# Patient Record
Sex: FEMALE | Race: WHITE
[De-identification: names, ages, dates, MRNs, and addresses within clinical notes are randomized per-mention and may not be internally consistent; named-entity substitution may affect disease eponyms.]

---

## 2017-03-08 ENCOUNTER — HOSPITAL ENCOUNTER (OUTPATIENT)
Dept: HOSPITAL 45 - C.MAMM | Age: 72
Discharge: HOME | End: 2017-03-08
Attending: INTERNAL MEDICINE
Payer: COMMERCIAL

## 2017-03-08 DIAGNOSIS — Z12.31: Primary | ICD-10-CM

## 2017-03-08 NOTE — MAMMOGRAPHY REPORT
BILATERAL DIGITAL SCREENING MAMMOGRAM WITH CAD: 3/8/2017

CLINICAL HISTORY: Routine screening examination.  





TECHNIQUE: Bilateral CC, MLO and repeat left MLO view for motion were obtained.  Current study was a
lso evaluated with a Computer Aided Detection (CAD) system.  



COMPARISON: Comparison is made to exams dated:  3/7/2016 mammogram, 11/5/2012 mammogram, 11/4/2011 [facility], and 3/12/2008.   



BREAST COMPOSITION:  There are scattered areas of fibroglandular density in both breasts.  



FINDINGS: There are stable asymmetries in the superior aspect of the breasts, and benign coarse calc
ifications in the right breast.  No suspicious mass, architectural distortion or cluster of microcal
cifications is seen.  



IMPRESSION:  ACR BI-RADS CATEGORY 1: NEGATIVE

There is no mammographic evidence of malignancy. A 1 year screening mammogram is recommended.  The p
atient will receive written notification of the results.  





Approximately 10% of breast cancers are not detected with mammography. A negative mammographic repor
t should not delay biopsy if a clinically suggestive mass is present.



Annelise Rojo M.D.          

ay/:3/8/2017 14:59:10  



Imaging Technologist: Lauren SAMSON(PETER)(M), [facility]

letter sent: Normal 1/2  

BI-RADS Code: ACR BI-RADS Category 1: Negative

## 2017-03-27 ENCOUNTER — HOSPITAL ENCOUNTER (OUTPATIENT)
Dept: HOSPITAL 45 - C.LAB1850 | Age: 72
Discharge: HOME | End: 2017-03-27
Attending: INTERNAL MEDICINE
Payer: COMMERCIAL

## 2017-03-27 DIAGNOSIS — E03.9: ICD-10-CM

## 2017-03-27 DIAGNOSIS — E55.9: ICD-10-CM

## 2017-03-27 DIAGNOSIS — E53.8: ICD-10-CM

## 2017-03-27 DIAGNOSIS — E78.5: Primary | ICD-10-CM

## 2017-03-27 DIAGNOSIS — D75.89: ICD-10-CM

## 2017-03-27 LAB
ALBUMIN/GLOB SERPL: 1.2 {RATIO} (ref 0.9–2)
ALP SERPL-CCNC: 105 U/L (ref 45–117)
ALT SERPL-CCNC: 37 U/L (ref 12–78)
ANION GAP SERPL CALC-SCNC: 10 MMOL/L (ref 3–11)
AST SERPL-CCNC: 19 U/L (ref 15–37)
BASOPHILS # BLD: 0.03 K/UL (ref 0–0.2)
BASOPHILS NFR BLD: 0.3 %
BUN SERPL-MCNC: 31 MG/DL (ref 7–18)
BUN/CREAT SERPL: 20.9 (ref 10–20)
CALCIUM SERPL-MCNC: 8.6 MG/DL (ref 8.5–10.1)
CHLORIDE SERPL-SCNC: 110 MMOL/L (ref 98–107)
CHOLEST/HDLC SERPL: 5.1 {RATIO}
CO2 SERPL-SCNC: 22 MMOL/L (ref 21–32)
COMPLETE: YES
CREAT SERPL-MCNC: 1.5 MG/DL (ref 0.6–1.2)
EOSINOPHIL NFR BLD AUTO: 321 K/UL (ref 130–400)
GLOBULIN SER-MCNC: 3.4 GM/DL (ref 2.5–4)
GLUCOSE SERPL-MCNC: 74 MG/DL (ref 70–99)
GLUCOSE UR QL: 49 MG/DL
HCT VFR BLD CALC: 38 % (ref 37–47)
IG%: 0.4 %
IMM GRANULOCYTES NFR BLD AUTO: 19.4 %
LYMPHOCYTES # BLD: 1.92 K/UL (ref 1.2–3.4)
MACROCYTES BLD QL SMEAR: PRESENT
MCH RBC QN AUTO: 34.8 PG (ref 25–34)
MCHC RBC AUTO-ENTMCNC: 31.3 G/DL (ref 32–36)
MCV RBC AUTO: 111.1 FL (ref 80–100)
MONOCYTES NFR BLD: 6.4 %
NEUTROPHILS # BLD AUTO: 3.1 %
NEUTROPHILS NFR BLD AUTO: 70.4 %
NITRITE UR QL STRIP: 417 MG/DL (ref 0–150)
PH UR: 249 MG/DL (ref 0–200)
PMV BLD AUTO: 9 FL (ref 7.4–10.4)
POTASSIUM SERPL-SCNC: 4.7 MMOL/L (ref 3.5–5.1)
RBC # BLD AUTO: 3.42 M/UL (ref 4.2–5.4)
SODIUM SERPL-SCNC: 142 MMOL/L (ref 136–145)
TSH SERPL-ACNC: 18.2 UIU/ML (ref 0.3–4.5)
WBC # BLD AUTO: 9.92 K/UL (ref 4.8–10.8)

## 2017-05-03 ENCOUNTER — HOSPITAL ENCOUNTER (OUTPATIENT)
Dept: HOSPITAL 45 - C.LAB1850 | Age: 72
Discharge: HOME | End: 2017-05-03
Attending: INTERNAL MEDICINE
Payer: COMMERCIAL

## 2017-05-03 DIAGNOSIS — E03.9: Primary | ICD-10-CM

## 2017-05-03 DIAGNOSIS — D50.9: ICD-10-CM

## 2017-05-03 LAB
ANION GAP SERPL CALC-SCNC: 7 MMOL/L (ref 3–11)
ANISOCYTOSIS BLD QL SMEAR: PRESENT
BASOPHILS # BLD: 0.05 K/UL (ref 0–0.2)
BASOPHILS NFR BLD: 0.6 %
BUN SERPL-MCNC: 32 MG/DL (ref 7–18)
BUN/CREAT SERPL: 20.2 (ref 10–20)
CALCIUM SERPL-MCNC: 9 MG/DL (ref 8.5–10.1)
CHLORIDE SERPL-SCNC: 109 MMOL/L (ref 98–107)
CO2 SERPL-SCNC: 25 MMOL/L (ref 21–32)
COMPLETE: YES
CREAT SERPL-MCNC: 1.6 MG/DL (ref 0.6–1.2)
EOSINOPHIL NFR BLD AUTO: 362 K/UL (ref 130–400)
GLUCOSE SERPL-MCNC: 91 MG/DL (ref 70–99)
HCT VFR BLD CALC: 38.9 % (ref 37–47)
IG%: 0.2 %
IMM GRANULOCYTES NFR BLD AUTO: 28.6 %
LYMPHOCYTES # BLD: 2.35 K/UL (ref 1.2–3.4)
MACROCYTES BLD QL SMEAR: PRESENT
MCH RBC QN AUTO: 36.3 PG (ref 25–34)
MCHC RBC AUTO-ENTMCNC: 32.4 G/DL (ref 32–36)
MCV RBC AUTO: 112.1 FL (ref 80–100)
MONOCYTES NFR BLD: 5.3 %
NEUTROPHILS # BLD AUTO: 4.1 %
NEUTROPHILS NFR BLD AUTO: 61.2 %
PMV BLD AUTO: 9.1 FL (ref 7.4–10.4)
POTASSIUM SERPL-SCNC: 4.7 MMOL/L (ref 3.5–5.1)
RBC # BLD AUTO: 3.47 M/UL (ref 4.2–5.4)
SODIUM SERPL-SCNC: 141 MMOL/L (ref 136–145)
TIBC SERPL-MCNC: 418 MCG/DL (ref 250–450)
TSH SERPL-ACNC: 4.8 UIU/ML (ref 0.3–4.5)
WBC # BLD AUTO: 8.23 K/UL (ref 4.8–10.8)

## 2017-06-04 ENCOUNTER — HOSPITAL ENCOUNTER (EMERGENCY)
Dept: HOSPITAL 45 - C.EDB | Age: 72
Discharge: HOME | End: 2017-06-04
Payer: COMMERCIAL

## 2017-06-04 VITALS
WEIGHT: 160.94 LBS | WEIGHT: 160.94 LBS | HEIGHT: 67.01 IN | BODY MASS INDEX: 25.26 KG/M2 | HEIGHT: 67.01 IN | BODY MASS INDEX: 25.26 KG/M2

## 2017-06-04 VITALS — SYSTOLIC BLOOD PRESSURE: 143 MMHG | DIASTOLIC BLOOD PRESSURE: 96 MMHG | HEART RATE: 110 BPM | OXYGEN SATURATION: 96 %

## 2017-06-04 VITALS — TEMPERATURE: 97.88 F

## 2017-06-04 DIAGNOSIS — R41.82: ICD-10-CM

## 2017-06-04 DIAGNOSIS — E86.0: ICD-10-CM

## 2017-06-04 DIAGNOSIS — I48.92: ICD-10-CM

## 2017-06-04 DIAGNOSIS — E87.2: ICD-10-CM

## 2017-06-04 DIAGNOSIS — Z82.49: ICD-10-CM

## 2017-06-04 DIAGNOSIS — I10: ICD-10-CM
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
## 2017-06-04 DIAGNOSIS — J32.9: Primary | ICD-10-CM

## 2017-06-04 DIAGNOSIS — A04.7: ICD-10-CM

## 2017-06-04 DIAGNOSIS — Z79.82: ICD-10-CM

## 2017-06-04 DIAGNOSIS — M19.90: ICD-10-CM

## 2017-06-04 LAB
ANION GAP SERPL CALC-SCNC: 15 MMOL/L (ref 3–11)
ANISOCYTOSIS BLD QL SMEAR: PRESENT
APPEARANCE UR: CLEAR
BASOPHILS # BLD: 0.02 K/UL (ref 0–0.2)
BASOPHILS NFR BLD: 0.2 %
BILIRUB UR-MCNC: (no result) MG/DL
BUN SERPL-MCNC: 54 MG/DL (ref 7–18)
BUN/CREAT SERPL: 28.2 (ref 10–20)
CALCIUM SERPL-MCNC: 9 MG/DL (ref 8.5–10.1)
CHLORIDE SERPL-SCNC: 113 MMOL/L (ref 98–107)
CO2 SERPL-SCNC: 16 MMOL/L (ref 21–32)
COLOR UR: YELLOW
COMPLETE: YES
CREAT CL PREDICTED SERPL C-G-VRATE: 25.7 ML/MIN
CREAT SERPL-MCNC: 1.9 MG/DL (ref 0.6–1.2)
EOSINOPHIL NFR BLD AUTO: 515 K/UL (ref 130–400)
GLUCOSE SERPL-MCNC: 103 MG/DL (ref 70–99)
HCT VFR BLD CALC: 38.7 % (ref 37–47)
IG%: 0.5 %
IMM GRANULOCYTES NFR BLD AUTO: 11.7 %
LYMPHOCYTES # BLD: 1.21 K/UL (ref 1.2–3.4)
MACROCYTES BLD QL SMEAR: PRESENT
MANUAL MICROSCOPIC REQUIRED?: NO
MCH RBC QN AUTO: 36.3 PG (ref 25–34)
MCHC RBC AUTO-ENTMCNC: 32 G/DL (ref 32–36)
MCV RBC AUTO: 113.2 FL (ref 80–100)
MONOCYTES NFR BLD: 5 %
NEUTROPHILS # BLD AUTO: 1.4 %
NEUTROPHILS NFR BLD AUTO: 81.2 %
NITRITE UR QL STRIP: (no result)
NRBC BLD AUTO-RTO: 0.2 %
PH UR STRIP: 5.5 [PH] (ref 4.5–7.5)
PMV BLD AUTO: 8.8 FL (ref 7.4–10.4)
POTASSIUM SERPL-SCNC: 5.1 MMOL/L (ref 3.5–5.1)
RBC # BLD AUTO: 3.42 M/UL (ref 4.2–5.4)
REVIEW REQ?: NO
SODIUM SERPL-SCNC: 144 MMOL/L (ref 136–145)
SP GR UR STRIP: 1.02 (ref 1–1.03)
URINE EPITHELIAL CELL AUTO: (no result) /LPF (ref 0–5)
UROBILINOGEN UR-MCNC: (no result) MG/DL
WBC # BLD AUTO: 10.36 K/UL (ref 4.8–10.8)
ZZUR CULT IF INDIC CLEAN CATCH: NO

## 2017-06-04 NOTE — EMERGENCY ROOM VISIT NOTE
History


Report prepared by Alexis:  Zuri Nj


Under the Supervision of:  Dr. Andrea Betancourt D.O.


First contact with patient:  15:51


Chief Complaint:  FALL


Stated Complaint:  FALL, WEAKNESS





History of Present Illness


The patient is a 71 year old female who presents to the Emergency Room with 

complaints of an episode of a fall occurring this morning. She reports that she 

uses a walker and had gotten up to use the restroom. She states that she slid 

off the toilet this morning onto the floor. The patient reports that she couldn'

t get up so rolled out the living room. She states that she laid on the floor 

for about five hours till someone found her. She reports that she does have a 

headache. The patient notes that she has neuropathy and notes that she hasn't 

eaten anything today.





   Source of History:  patient


   Onset:  this morning


   Position:  other (global)


   Quality:  other (global)


   Timing:  other (episode)


   Associated Symptoms:  + headache





Review of Systems


See HPI for pertinent positives & negatives. A total of 10 systems reviewed and 

were otherwise negative.





Past Medical & Surgical


Medical Problems:


(1) Altered mental status


(2) Arthritis


(3) Atrial flutter


(4) Bimalleolar fracture of right ankle


(5) Clostridium difficile colitis


(6) Confusion


(7) Fracture dislocation of right ankle joint


(8) Hypertension


(9) Metabolic acidosis


(10) Sepsis associated hypotension


(11) Sinusitis


(12) Trimalleolar fracture of ankle, closed








Family History





Heart disease


Hypertension





Social History


Smoking Status:  Never Smoker


Alcohol Use:  occasionally


Drug Use:  none


Marital Status:  


Housing Status:  lives alone


Occupation Status:  retired





Current/Historical Medications


Scheduled


Amoxicillin & Pot Clavulanate (Augmentin 875-125 mg), 1 TAB PO BID


Aspirin (Aspirin Ec), 81 MG PO DAILY


Atorvastatin (Lipitor), 10 MG PO HS


Escitalopram Oxalate (Escitalopram Oxalate), 20 MG PO DAILY


Fenofibrate (Fenofibrate), 48 MG PO DAILY


Gabapentin (Neurontin), 300 MG PO TID


Labetalol Hcl (Labetalol Hcl), 200 MG PO BID


Levothyroxine Sodium (Levothyroxine Sodium), 1 TAB PO DAILY


Lisinopril (Zestril), 40 MG PO DAILY


Mirtazapine (Mirtazapine), 7.5 MG PO HS


Omeprazole (Prilosec), 20 MG PO DAILY





Allergies


Coded Allergies:  


     Sulfa Antibiotics (Verified  Allergy, Severe, STOMACH SWELLS; STOMACH 

BLOCKAGE? PER PATIENT, 2/10/16)





Physical Exam


Vital Signs











  Date Time  Temp Pulse Resp B/P (MAP) Pulse Ox O2 Delivery O2 Flow Rate FiO2


 


6/4/17 17:12  109 18 114/79 99 Room Air  


 


6/4/17 15:52 36.6 113 20 108/67 97 Room Air  











Physical Exam


CONSTITUTIONAL/VITAL SIGNS: Reviewed / noted above.


GENERAL: Non-toxic in appearance. 


INTEGUMENTARY: Warm, dry, and Pink.


HEAD: Normocephalic.


EYES: without scleral icterus or trauma.


ENT/OROPHARYNX: clear and moist.


LYMPHADENOPATHY/NECK: Is supple without lymphadenopathy or meningismus.


RESPIRATORY: Lungs clear and equal.


CARDIOVASCULAR: Regular rate and rhythm.


GI/ABDOMEN: Soft and nontender. No organomegaly or pulsatile mass. No rebound 

or guarding. Normal bowel sounds.


EXTREMITIES: Warm and well perfused.


BACK: No CVA tenderness.


NEUROLOGICAL: Intact without focal deficits. 


PSYCHIATRIC: normal affect.


MUSCULOSKELETAL: Normally developed with good muscle tone.





Medical Decision & Procedures


ER Provider


Diagnostic Interpretation:


Radiology results as stated below per my review and radiologist interpretation:





HEAD CT NONCONTRAST





CT DOSE: 537.48 mGy.cm





HISTORY:      EVALUATE ALTERED MENTAL STATUS/WEAKNESS





TECHNIQUE: Multiaxial CT images of the head were performed without the use of


intravenous contrast. Automated exposure control was utilized for this study.





Comparison: Head CT 10/19/2015.





Findings: There is again noted a right cochlear implant. This results in


metallic artifact. Fluid levels with near complete opacification of the left


ethmoid air cells, left frontal sinus, and left maxillary sinus. The left


mastoid air cells are clear. The calvarium and skull base are intact. The


ventricles and sulci are within normal limits. There is no mass, hematoma,


midline shift, or acute infarct.





Impression:





1. Acute left paranasal sinusitis.


2. 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 This


results in suboptimal evaluation. However, no definite acute intracranial


abnormality. 











Electronically signed by:  Amandeep Enrique M.D.


6/4/2017 5:19 PM





Dictated Date/Time:  6/4/2017 5:15 PM





Laboratory Results


6/4/17 16:25








Red Blood Count 3.42, Mean Corpuscular Volume 113.2, Mean Corpuscular 

Hemoglobin 36.3, Mean Corpuscular Hemoglobin Concent 32.0, Mean Platelet Volume 

8.8, Neutrophils (%) (Auto) 81.2, Lymphocytes (%) (Auto) 11.7, Monocytes (%) (

Auto) 5.0, Eosinophils (%) (Auto) 1.4, Basophils (%) (Auto) 0.2, Neutrophils # (

Auto) 8.42, Lymphocytes # (Auto) 1.21, Monocytes # (Auto) 0.52, Eosinophils # (

Auto) 0.14, Basophils # (Auto) 0.02





6/4/17 16:25

















Test


  6/4/17


16:25 6/4/17


17:30


 


White Blood Count


  10.36 K/uL


(4.8-10.8) 


 


 


Red Blood Count


  3.42 M/uL


(4.2-5.4) 


 


 


Hemoglobin


  12.4 g/dL


(12.0-16.0) 


 


 


Hematocrit 38.7 % (37-47)  


 


Mean Corpuscular Volume


  113.2 fL


() 


 


 


Mean Corpuscular Hemoglobin


  36.3 pg


(25-34) 


 


 


Mean Corpuscular Hemoglobin


Concent 32.0 g/dl


(32-36) 


 


 


Platelet Count


  515 K/uL


(130-400) 


 


 


Mean Platelet Volume


  8.8 fL


(7.4-10.4) 


 


 


Neutrophils (%) (Auto) 81.2 %  


 


Lymphocytes (%) (Auto) 11.7 %  


 


Monocytes (%) (Auto) 5.0 %  


 


Eosinophils (%) (Auto) 1.4 %  


 


Basophils (%) (Auto) 0.2 %  


 


Neutrophils # (Auto)


  8.42 K/uL


(1.4-6.5) 


 


 


Lymphocytes # (Auto)


  1.21 K/uL


(1.2-3.4) 


 


 


Monocytes # (Auto)


  0.52 K/uL


(0.11-0.59) 


 


 


Eosinophils # (Auto)


  0.14 K/uL


(0-0.5) 


 


 


Basophils # (Auto)


  0.02 K/uL


(0-0.2) 


 


 


RDW Standard Deviation


  59.2 fL


(36.4-46.3) 


 


 


RDW Coefficient of Variation


  14.4 %


(11.5-14.5) 


 


 


Immature Granulocyte % (Auto) 0.5 %  


 


Immature Granulocyte # (Auto)


  0.05 K/uL


(0.00-0.02) 


 


 


Nucleated RBC Absolute Count


(auto) 0.02 K/uL


(0-0) 


 


 


Nucleated Red Blood Cells % 0.2 %  


 


Anisocytosis PRESENT  


 


Macrocytosis PRESENT  


 


Anion Gap


  15.0 mmol/L


(3-11) 


 


 


Est Creatinine Clear Calc


Drug Dose 25.7 ml/min 


  


 


 


Estimated GFR (


American) 29.8 


  


 


 


Estimated GFR (Non-


American 25.7 


  


 


 


BUN/Creatinine Ratio 28.2 (10-20)  


 


Calcium Level


  9.0 mg/dl


(8.5-10.1) 


 


 


Urine Color  YELLOW 


 


Urine Appearance  CLEAR (CLEAR) 


 


Urine pH  5.5 (4.5-7.5) 


 


Urine Specific Gravity


  


  1.018


(1.000-1.030)


 


Urine Protein  NEG (NEG) 


 


Urine Glucose (UA)  NEG (NEG) 


 


Urine Ketones  NEG (NEG) 


 


Urine Occult Blood  NEG (NEG) 


 


Urine Nitrite  NEG (NEG) 


 


Urine Bilirubin  NEG (NEG) 


 


Urine Urobilinogen  NEG (NEG) 


 


Urine Leukocyte Esterase  NEG (NEG) 


 


Urine WBC (Auto)  1-5 /hpf (0-5) 


 


Urine RBC (Auto)  0-4 /hpf (0-4) 


 


Urine Hyaline Casts (Auto)  1-5 /lpf (0-5) 


 


Urine Epithelial Cells (Auto)


  


  20-30 /lpf


(0-5)


 


Urine Bacteria (Auto)  NEG (NEG) 





Laboratory results as stated above per my review.





Medications Administered











 Medications


  (Trade)  Dose


 Ordered  Sig/Nayeli


 Route  Start Time


 Stop Time Status Last Admin


Dose Admin


 


 Sodium Chloride  1,000 ml @ 


 999 mls/hr  Q1H1M STAT


 IV  6/4/17 15:55


 6/4/17 16:55 DC 6/4/17 16:40


999 MLS/HR


 


 Acetaminophen


  (Tylenol Tab)  1,000 mg  NOW  STAT


 PO  6/4/17 17:13


 6/4/17 17:14 DC 6/4/17 17:19


1,000 MG











ED Course


1551: Previous medical records were reviewed. The patient was evaluated in room 

B11B. A complete history and physical examination was performed.





1555: Ordered NSS 1000 ml @ 999 mls/hr IV.





1713: Ordered Tylenol Tab 1000 mg PO.





1811: On reevaluation, the patient is resting comfortably. I discussed the 

results and findings with the patient. She verbalized agreement of the 

treatment plan. The patient was discharged home.





Medical Decision


Medication Reconciliation: I attest that I have personally reviewed the patient'

s current medication list.





Blood pressure Screening: Patient was found to have normal blood pressure on 

screening and does not require follow-up.





Differential diagnosis:


Etiologies such as fracture, dislocation, intra-abdominal, pneumothorax, 

intrathoracic , intracranial, neurologic, as well as other traumatic 

pathologies were entertained.





Differential diagnosis:


Etiologies such as metabolic, infection, hypo/hyperglycemia, electrolyte 

abnormalities, cardiac sources, intracerebral event, toxicologic, neurologic, 

as well as others were entertained. 








This is a 71-year-old female who presents to the ED after a fall.  The patient 

states that she fell this morning and could not get up.  She was on the ground 

for about 45 hours.  She states that she crawled around but could not get 

herself up.  She slid off the toilet.  She was brought here by EMS.  She 

complains of a headache.  She denies striking her head.  She is concerned about 

a possible sinus infection.  Her vital signs are normal.  Her physical exam 

reveals no ischemic abnormalities other than some dried mucous membranes.  CT 

scan of the brain reveals a left paranasal sinusitis.  CBC is normal.  The BUN 

is 54 and creatinine is 1.9 (1.6 baseline).  The patient tolerated by mouth 

fluids as well as a liter of normal saline IV.  Urine did not show infection or 

ketones.  The patient was given Tylenol by mouth for her headache.  She was 

placed on Augmentin for sinus infection.  She was encouraged to drink plenty of 

fluids.  She was told the results and felt to be stable for discharge and 

outpatient follow-up.  She does use a walker and was told to use this if she is 

feeling unsteady.





Impression





 Primary Impression:  


 Fall


 Additional Impressions:  


 Sinusitis


 Dehydration





Scribe Attestation


The scribe's documentation has been prepared under my direction and personally 

reviewed by me in its entirety. I confirm that the note above accurately 

reflects all work, treatment, procedures, and medical decision making performed 

by me.





Departure Information


Dispostion


Home / Self-Care





Prescriptions





Amoxicillin & Pot Clavulanate (Augmentin 875-125 mg) 1 Tab Tab


1 TAB PO BID, #14 TAB


   Prov: Andrea Betancourt D.O.         6/4/17





Referrals


ED Andujar., MD (PCP)





Patient Instructions


ED Sinusitis Abx Tx, My [facility]





Additional Instructions





Augmentin as prescribed.





Take Tylenol as needed for headache.





Drink plenty of fluids.





Follow-up with your doctor for further care and evaluation in 1-5 days.  Return 

to the emergency department for worsening or new symptoms or any concerns.


You have been examined and treated today on an emergency basis only. This is 

not a substitute for, or an effort to provide, complete comprehensive medical 

care. It is impossible to recognize and treat all injuries or illnesses in a 

single emergency department visit. It is therefore important that you follow up 

closely with your doctor.  Call as soon as possible for an appointment.





Problem Qualifiers

## 2017-06-04 NOTE — DIAGNOSTIC IMAGING REPORT
HEAD CT NONCONTRAST



CT DOSE: 537.48 mGy.cm



HISTORY:      EVALUATE ALTERED MENTAL STATUS/WEAKNESS



TECHNIQUE: Multiaxial CT images of the head were performed without the use of

intravenous contrast. Automated exposure control was utilized for this study.



Comparison: Head CT 10/19/2015.



Findings: There is again noted a right cochlear implant. This results in

metallic artifact. Fluid levels with near complete opacification of the left

ethmoid air cells, left frontal sinus, and left maxillary sinus. The left

mastoid air cells are clear. The calvarium and skull base are intact. The

ventricles and sulci are within normal limits. There is no mass, hematoma,

midline shift, or acute infarct.



Impression:



1. Acute left paranasal sinusitis.

2. 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 This

results in suboptimal evaluation. However, no definite acute intracranial

abnormality. 







Electronically signed by:  Amandeep Enrique M.D.

6/4/2017 5:19 PM



Dictated Date/Time:  6/4/2017 5:15 PM

## 2017-07-10 ENCOUNTER — HOSPITAL ENCOUNTER (OUTPATIENT)
Dept: HOSPITAL 45 - C.LAB1850 | Age: 72
Discharge: HOME | End: 2017-07-10
Attending: INTERNAL MEDICINE
Payer: COMMERCIAL

## 2017-07-10 DIAGNOSIS — D47.3: Primary | ICD-10-CM

## 2017-07-10 DIAGNOSIS — E86.0: ICD-10-CM

## 2017-07-10 LAB
ALBUMIN/GLOB SERPL: 0.8 {RATIO} (ref 0.9–2)
ALP SERPL-CCNC: 168 U/L (ref 45–117)
ALT SERPL-CCNC: 44 U/L (ref 12–78)
ANION GAP SERPL CALC-SCNC: 9 MMOL/L (ref 3–11)
AST SERPL-CCNC: 28 U/L (ref 15–37)
BASOPHILS # BLD: 0.07 K/UL (ref 0–0.2)
BASOPHILS NFR BLD: 0.8 %
BUN SERPL-MCNC: 23 MG/DL (ref 7–18)
BUN/CREAT SERPL: 23.7 (ref 10–20)
CALCIUM SERPL-MCNC: 9.1 MG/DL (ref 8.5–10.1)
CHLORIDE SERPL-SCNC: 112 MMOL/L (ref 98–107)
CO2 SERPL-SCNC: 24 MMOL/L (ref 21–32)
COMPLETE: YES
CREAT SERPL-MCNC: 0.98 MG/DL (ref 0.6–1.2)
EOSINOPHIL NFR BLD AUTO: 380 K/UL (ref 130–400)
GLOBULIN SER-MCNC: 3.8 GM/DL (ref 2.5–4)
GLUCOSE SERPL-MCNC: 93 MG/DL (ref 70–99)
HCT VFR BLD CALC: 33.2 % (ref 37–47)
IG%: 0.2 %
IMM GRANULOCYTES NFR BLD AUTO: 19.3 %
LYMPHOCYTES # BLD: 1.59 K/UL (ref 1.2–3.4)
MACROCYTES BLD QL SMEAR: PRESENT
MCH RBC QN AUTO: 35.8 PG (ref 25–34)
MCHC RBC AUTO-ENTMCNC: 31 G/DL (ref 32–36)
MCV RBC AUTO: 115.3 FL (ref 80–100)
MONOCYTES NFR BLD: 6.9 %
NEUTROPHILS # BLD AUTO: 5.8 %
NEUTROPHILS NFR BLD AUTO: 67 %
PMV BLD AUTO: 8.7 FL (ref 7.4–10.4)
POTASSIUM SERPL-SCNC: 4 MMOL/L (ref 3.5–5.1)
RBC # BLD AUTO: 2.88 M/UL (ref 4.2–5.4)
SODIUM SERPL-SCNC: 145 MMOL/L (ref 136–145)
WBC # BLD AUTO: 8.24 K/UL (ref 4.8–10.8)

## 2017-07-24 ENCOUNTER — HOSPITAL ENCOUNTER (OUTPATIENT)
Dept: HOSPITAL 45 - C.LAB1850 | Age: 72
Discharge: HOME | End: 2017-07-24
Attending: INTERNAL MEDICINE
Payer: COMMERCIAL

## 2017-07-24 DIAGNOSIS — D64.9: Primary | ICD-10-CM

## 2017-09-25 ENCOUNTER — HOSPITAL ENCOUNTER (OUTPATIENT)
Dept: HOSPITAL 45 - C.LAB1850 | Age: 72
Discharge: HOME | End: 2017-09-25
Attending: INTERNAL MEDICINE
Payer: COMMERCIAL

## 2017-09-25 DIAGNOSIS — E55.9: ICD-10-CM

## 2017-09-25 DIAGNOSIS — E03.9: Primary | ICD-10-CM

## 2017-09-25 DIAGNOSIS — D50.9: ICD-10-CM

## 2017-09-25 DIAGNOSIS — E53.8: ICD-10-CM

## 2017-09-25 DIAGNOSIS — E78.5: ICD-10-CM

## 2017-09-25 LAB
ALBUMIN/GLOB SERPL: 1.2 {RATIO} (ref 0.9–2)
ALP SERPL-CCNC: 93 U/L (ref 45–117)
ALT SERPL-CCNC: 27 U/L (ref 12–78)
ANION GAP SERPL CALC-SCNC: 9 MMOL/L (ref 3–11)
AST SERPL-CCNC: 21 U/L (ref 15–37)
BASOPHILS # BLD: 0.03 K/UL (ref 0–0.2)
BASOPHILS NFR BLD: 0.4 %
BUN SERPL-MCNC: 27 MG/DL (ref 7–18)
BUN/CREAT SERPL: 20.9 (ref 10–20)
CALCIUM SERPL-MCNC: 8.9 MG/DL (ref 8.5–10.1)
CHLORIDE SERPL-SCNC: 108 MMOL/L (ref 98–107)
CHOLEST/HDLC SERPL: 3.9 {RATIO}
CO2 SERPL-SCNC: 23 MMOL/L (ref 21–32)
COMPLETE: YES
CREAT SERPL-MCNC: 1.3 MG/DL (ref 0.6–1.2)
EOSINOPHIL NFR BLD AUTO: 315 K/UL (ref 130–400)
GLOBULIN SER-MCNC: 3.4 GM/DL (ref 2.5–4)
GLUCOSE SERPL-MCNC: 61 MG/DL (ref 70–99)
GLUCOSE UR QL: 51 MG/DL
HCT VFR BLD CALC: 37.3 % (ref 37–47)
IG%: 0.1 %
IMM GRANULOCYTES NFR BLD AUTO: 24.3 %
KETONES UR QL STRIP: 96 MG/DL
LYMPHOCYTES # BLD: 1.64 K/UL (ref 1.2–3.4)
MACROCYTES BLD QL SMEAR: PRESENT
MCH RBC QN AUTO: 34.8 PG (ref 25–34)
MCHC RBC AUTO-ENTMCNC: 31.6 G/DL (ref 32–36)
MCV RBC AUTO: 110 FL (ref 80–100)
MONOCYTES NFR BLD: 7.8 %
NEUTROPHILS # BLD AUTO: 3.7 %
NEUTROPHILS NFR BLD AUTO: 63.7 %
NITRITE UR QL STRIP: 267 MG/DL (ref 0–150)
PH UR: 200 MG/DL (ref 0–200)
PMV BLD AUTO: 9.1 FL (ref 7.4–10.4)
POTASSIUM SERPL-SCNC: 4.4 MMOL/L (ref 3.5–5.1)
RBC # BLD AUTO: 3.39 M/UL (ref 4.2–5.4)
SODIUM SERPL-SCNC: 140 MMOL/L (ref 136–145)
TIBC SERPL-MCNC: 350 MCG/DL (ref 250–450)
TSH SERPL-ACNC: 1.79 UIU/ML (ref 0.3–4.5)
VERY LOW DENSITY LIPOPROT CALC: 53 MG/DL
WBC # BLD AUTO: 6.76 K/UL (ref 4.8–10.8)

## 2017-12-04 ENCOUNTER — HOSPITAL ENCOUNTER (OUTPATIENT)
Dept: HOSPITAL 45 - X.SURG | Age: 72
Discharge: HOME | End: 2017-12-04
Attending: OPHTHALMOLOGY
Payer: COMMERCIAL

## 2017-12-04 VITALS — HEART RATE: 86 BPM | OXYGEN SATURATION: 97 % | SYSTOLIC BLOOD PRESSURE: 158 MMHG | DIASTOLIC BLOOD PRESSURE: 88 MMHG

## 2017-12-04 VITALS
BODY MASS INDEX: 25.17 KG/M2 | BODY MASS INDEX: 25.17 KG/M2 | HEIGHT: 67.01 IN | HEIGHT: 67.01 IN | WEIGHT: 160.34 LBS | WEIGHT: 160.34 LBS

## 2017-12-04 VITALS — TEMPERATURE: 98.06 F

## 2017-12-04 DIAGNOSIS — E03.9: ICD-10-CM

## 2017-12-04 DIAGNOSIS — H25.11: Primary | ICD-10-CM

## 2017-12-04 DIAGNOSIS — G62.9: ICD-10-CM

## 2017-12-04 DIAGNOSIS — F43.10: ICD-10-CM

## 2017-12-04 DIAGNOSIS — Z86.73: ICD-10-CM

## 2017-12-04 DIAGNOSIS — Z79.899: ICD-10-CM

## 2017-12-04 DIAGNOSIS — I10: ICD-10-CM

## 2017-12-04 DIAGNOSIS — Z79.82: ICD-10-CM

## 2017-12-04 DIAGNOSIS — F32.9: ICD-10-CM

## 2017-12-04 DIAGNOSIS — F41.9: ICD-10-CM

## 2017-12-04 RX ADMIN — MOXIFLOXACIN HYDROCHLORIDE SCH DROP: 5 SOLUTION/ DROPS OPHTHALMIC at 08:24

## 2017-12-04 RX ADMIN — CYCLOPENTOLATE HYDROCHLORIDE SCH DROP: 10 SOLUTION/ DROPS OPHTHALMIC at 08:27

## 2017-12-04 RX ADMIN — CYCLOPENTOLATE HYDROCHLORIDE SCH DROP: 10 SOLUTION/ DROPS OPHTHALMIC at 08:22

## 2017-12-04 RX ADMIN — KETOROLAC TROMETHAMINE SCH DROP: 5 SOLUTION OPHTHALMIC at 08:23

## 2017-12-04 RX ADMIN — PHENYLEPHRINE HYDROCHLORIDE SCH DROP: 25 SOLUTION/ DROPS OPHTHALMIC at 08:25

## 2017-12-04 RX ADMIN — PHENYLEPHRINE HYDROCHLORIDE SCH DROP: 25 SOLUTION/ DROPS OPHTHALMIC at 08:20

## 2017-12-04 RX ADMIN — TROPICAMIDE SCH DROP: 10 SOLUTION/ DROPS OPHTHALMIC at 08:21

## 2017-12-04 RX ADMIN — MOXIFLOXACIN HYDROCHLORIDE SCH DROP: 5 SOLUTION/ DROPS OPHTHALMIC at 08:34

## 2017-12-04 RX ADMIN — TROPICAMIDE SCH DROP: 10 SOLUTION/ DROPS OPHTHALMIC at 08:26

## 2017-12-04 RX ADMIN — KETOROLAC TROMETHAMINE SCH DROP: 5 SOLUTION OPHTHALMIC at 08:28

## 2017-12-04 NOTE — [facility] OPERATIVE REPORT
Operative Report


Operative Date


Dec 4, 2017.





Pre-Operative Diagnosis





Right Eye Cataract





Post-Operative Diagnosis





Same





Procedure(s) Performed





Right Eye Cataract Phacoemulsification With Intraocular Lens Implant





Surgeon


Dr. Fong





Assistant Surgeon(s)


None





Estimated Blood Loss


None





Findings


cataract right eye





Fluids (cc crystalloids)


see anesthesia record





Specimens





None





Drains


none





Anesthesia


local with sedation





Complication(s)


None





Disposition


Recovery Room / PACU





Implants


mx60 21.5





Indications


decreased vision right eye





Description of Procedure


After informed consent was obtained in the holding area the patient was


wheeled back to the operating room where cardiac monitoring leads and oxygen


by nasal cannula was administered by Anesthesia. Gentle IV sedation was


given, and the patient's right eye was prepped and draped in usual sterile


fashion. A wire lid speculum was placed into the right eye and the operating


microscope was swung into position. Using 0.12 forceps and a Supersharp blade


a paracentesis port was made 2 o'clock hours away from the 9 o'clock position


of the patient's right eye. 1% non-preserved Lidocaine was then injected into


the anterior chamber for anesthesia.  A 2.0 mm keratotome blade was then used


to make a shelved clear corneal incision at the 9 o'clock position of the


right eye. Amvisc was injected into the anterior chamber and a cystotome and


Utrata forceps were used to perform a curvilinear capsulorrhexis. BSS on a


hydrodissection cannula was used to hydrodissect the lens nucleus away from


the capsular bag. The phacoemulsification handpiece was then used in a stop


and chop fashion to remove the lens nucleus. The irrigation and aspiration


handpiece was then used to remove the residual cortical material. Amvisc was


injected into the capsular bag and anterior chamber and a [employer] MX60


21.5 Diopter intraocular lens was injected into the capsular bag.  Irrigation


and aspiration handpiece was used to remove the residual viscoelastic


material. The wounds were hydrated and noted to be watertight.  The wire lid


speculum was removed from the eye.  Vigamox, Brimonidine, and TobraDex


ointment were placed on the eye and it was shielded.  It should be noted that


EndoCoat was used extensively during the case to protect the cornea endothelium.


 


DISPOSITION:  The patient tolerated the procedure well and was wheeled to the


post anesthesia care unit in stable condition.


I attest to the content of the Intraoperative Record and any orders documented 

therein.  Any exceptions are noted below.


I attest to the content of the Intraoperative Record and any orders documented 

therein.  Any exceptions are noted below.

## 2017-12-04 NOTE — DISCHARGE INSTRUCTIONS-[facility]
Discharge Instructions


Date of Service


Dec 4, 2017.





Visit


Reason for Visit:  Cataract Right Eye





Discharge


Discharge Diagnosis / Problem:  cataract right eye





Discharge Goals


Goal(s):  Improve function





Activity Recommendations


Activity Limitations:  per Instructions/Follow-up section


Lifting Limitations:  no more than 5 pounds





Anesthesia


.





Post Anesthesia Instructions:





If you have had General Anesthesia or IV Sedation:





*  Do not drive today.


*  Resume driving when surgeon permits.


*  Do not make important decisions or sign legal documents today.


*  Call surgeon for:





   1.  Temperature elevations greater than 101 degrees F.


   2.  Uncontrollable pain.


   3.  Excessive bleeding.


   4.  Persistent nausea and vomiting.


   5.  Medication intolerance (nausea, vomiting or rash).





*  For nausea and vomiting use only clear liquids such as: tea, soda, bouillon 

until nausea subsides, then gradually increase diet as tolerated.





*  If you have any concerns or questions, call your surgeon's office.  If 

physician is unavailable and it is an emergency, call 911 or go to the nearest 

emergency room.





.





Instructions / Follow-Up


Instructions / Follow-Up





ACTIVITY RECOMMENDATIONS:





*  Light activities





*  You may walk outside, read, watch television.





*  Mild irritation and blurred vision are common for the first few days, 

redness around the white part of the eye is common.








MEDICATIONS:





Resume previous medications unless instructed otherwise by your surgeon.





Eye drops (today and tomorrow):


   


   Polytrim - one drop in operative eye every 2 hours while awake


   Prednisolone 1% - one drop in operative eye every 2 hours while awake


   Prolensa - one drop operative eye 1 times daily








SPECIAL CARE INSTRUCTIONS:





*  If any problems or concerns, please call Dr. Fong's office at [phone].





*  Keep plastic shield taped over eye to sleep at night.





*  Keep plastic shield taped over eye except to administer eye drops.





*  Keep plastic shield on until office visit the following day.








FOLLOW UP VISIT:





Follow-up with Dr. Fong in the [place] office as scheduled.





If not already scheduled, please call the office at [phone].





Diet Recommendations


Home Diet:  resume previous diet





Procedures


Procedures Performed:  


Right Eye Cataract Phacoemulsification With Intraocular Lens Implant





Pending Studies


Studies pending at discharge:  no





Medical Emergencies








.


Who to Call and When:





Medical Emergencies:  If at any time you feel your situation is an emergency, 

please call 911 immediately.





.





Non-Emergent Contact


Non-Emergency issues call your:  Ophthalmologist





.


.








"Provider Documentation" section prepared by Sergey Fong.








.

## 2017-12-04 NOTE — ANESTHESIA PROGRESS NT - [facility]
Anesthesia Post Op Note


Date & Time


Dec 4, 2017 at 10:04





Vital Signs


Pain Intensity:  0





Vital Signs Past 12 Hours








  Date Time  Temp Pulse Resp B/P (MAP) Pulse Ox O2 Delivery O2 Flow Rate FiO2


 


12/4/17 09:38 36.7 94 16 168/85 (112) 97 Room Air  


 


12/4/17 08:12 37.1 90 18 162/97 (118) 95 Room Air  











Notes


Mental Status:  alert / awake / arousable, participated in evaluation


Pt Amnestic to Procedure:  Yes


Nausea / Vomiting:  adequately controlled


Pain:  adequately controlled


Airway Patency, RR, SpO2:  stable & adequate


BP & HR:  stable & adequate


Hydration State:  stable & adequate


Anesthetic Complications:  no major complications apparent

## 2017-12-18 ENCOUNTER — HOSPITAL ENCOUNTER (OUTPATIENT)
Dept: HOSPITAL 45 - X.SURG | Age: 72
Discharge: HOME | End: 2017-12-18
Attending: OPHTHALMOLOGY
Payer: COMMERCIAL

## 2017-12-18 VITALS — OXYGEN SATURATION: 99 % | SYSTOLIC BLOOD PRESSURE: 141 MMHG | DIASTOLIC BLOOD PRESSURE: 95 MMHG | HEART RATE: 94 BPM

## 2017-12-18 VITALS
WEIGHT: 160.34 LBS | BODY MASS INDEX: 25.17 KG/M2 | BODY MASS INDEX: 25.17 KG/M2 | HEIGHT: 67.01 IN | HEIGHT: 67.01 IN | WEIGHT: 160.34 LBS

## 2017-12-18 VITALS — TEMPERATURE: 97.16 F

## 2017-12-18 DIAGNOSIS — F41.9: ICD-10-CM

## 2017-12-18 DIAGNOSIS — I10: ICD-10-CM

## 2017-12-18 DIAGNOSIS — H25.12: Primary | ICD-10-CM

## 2017-12-18 DIAGNOSIS — Z86.73: ICD-10-CM

## 2017-12-18 DIAGNOSIS — F32.9: ICD-10-CM

## 2017-12-18 DIAGNOSIS — Z90.89: ICD-10-CM

## 2017-12-18 DIAGNOSIS — E03.9: ICD-10-CM

## 2017-12-18 DIAGNOSIS — K21.9: ICD-10-CM

## 2017-12-18 RX ADMIN — MOXIFLOXACIN HYDROCHLORIDE SCH DROP: 5 SOLUTION/ DROPS OPHTHALMIC at 07:29

## 2017-12-18 RX ADMIN — TROPICAMIDE SCH DROP: 10 SOLUTION/ DROPS OPHTHALMIC at 07:27

## 2017-12-18 RX ADMIN — KETOROLAC TROMETHAMINE SCH DROP: 5 SOLUTION OPHTHALMIC at 07:29

## 2017-12-18 RX ADMIN — MOXIFLOXACIN HYDROCHLORIDE SCH DROP: 5 SOLUTION/ DROPS OPHTHALMIC at 07:38

## 2017-12-18 RX ADMIN — CYCLOPENTOLATE HYDROCHLORIDE SCH DROP: 10 SOLUTION/ DROPS OPHTHALMIC at 07:32

## 2017-12-18 RX ADMIN — TROPICAMIDE SCH DROP: 10 SOLUTION/ DROPS OPHTHALMIC at 07:32

## 2017-12-18 RX ADMIN — CYCLOPENTOLATE HYDROCHLORIDE SCH DROP: 10 SOLUTION/ DROPS OPHTHALMIC at 07:28

## 2017-12-18 RX ADMIN — PHENYLEPHRINE HYDROCHLORIDE SCH DROP: 25 SOLUTION/ DROPS OPHTHALMIC at 07:31

## 2017-12-18 RX ADMIN — PHENYLEPHRINE HYDROCHLORIDE SCH DROP: 25 SOLUTION/ DROPS OPHTHALMIC at 07:27

## 2017-12-18 RX ADMIN — KETOROLAC TROMETHAMINE SCH DROP: 5 SOLUTION OPHTHALMIC at 07:33

## 2017-12-18 NOTE — DISCHARGE INSTRUCTIONS-S[facility]
Discharge Instructions


Date of Service


Dec 18, 2017.





Visit


Reason for Visit:  Cataract Left Eye





Discharge


Discharge Diagnosis / Problem:  cataract left eye





Discharge Goals


Goal(s):  Improve function





Activity Recommendations


Activity Limitations:  per Instructions/Follow-up section


Lifting Limitations:  no more than 5 pounds





Anesthesia


.





Post Anesthesia Instructions:





If you have had General Anesthesia or IV Sedation:





*  Do not drive today.


*  Resume driving when surgeon permits.


*  Do not make important decisions or sign legal documents today.


*  Call surgeon for:





   1.  Temperature elevations greater than 101 degrees F.


   2.  Uncontrollable pain.


   3.  Excessive bleeding.


   4.  Persistent nausea and vomiting.


   5.  Medication intolerance (nausea, vomiting or rash).





*  For nausea and vomiting use only clear liquids such as: tea, soda, bouillon 

until nausea subsides, then gradually increase diet as tolerated.





*  If you have any concerns or questions, call your surgeon's office.  If 

physician is unavailable and it is an emergency, call 911 or go to the nearest 

emergency room.





.





Instructions / Follow-Up


Instructions / Follow-Up





ACTIVITY RECOMMENDATIONS:





*  Light activities





*  You may walk outside, read, watch television.





*  Mild irritation and blurred vision are common for the first few days, 

redness around the white part of the eye is common.








MEDICATIONS:





Resume previous medications unless instructed otherwise by your surgeon.





Eye drops (today and tomorrow):


   


   Durezol - one drop in operative eye 4 x a day


   Ciprofloxacin - one drop in operative eye every 2 hours while awake


   





SPECIAL CARE INSTRUCTIONS:





*  If any problems or concerns, please call Dr. Fong's office at [phone].





*  Keep plastic shield taped over eye to sleep at night.





*  Keep plastic shield taped over eye except to administer eye drops.





*  Keep plastic shield on until office visit the following day.








FOLLOW UP VISIT:





Follow-up with Dr. Fong in the [place] office as scheduled.





If not already scheduled, please call the office at [phone].





Diet Recommendations


Home Diet:  resume previous diet





Procedures


Procedures Performed:  


Left Cataract Phacoemulsification With Intraocular Lens Implant





Pending Studies


Studies pending at discharge:  no





Medical Emergencies








.


Who to Call and When:





Medical Emergencies:  If at any time you feel your situation is an emergency, 

please call 911 immediately.





.





Non-Emergent Contact


Non-Emergency issues call your:  Ophthalmologist





.


.








"Provider Documentation" section prepared by Sergey Fong.








.

## 2017-12-18 NOTE — ANESTHESIA PROGRESS NT - [facility]
Anesthesia Post Op Note


Date & Time


Dec 18, 2017 at 09:35





Vital Signs


Pain Intensity:  0





Vital Signs Past 12 Hours








  Date Time  Temp Pulse Resp B/P (MAP) Pulse Ox O2 Delivery O2 Flow Rate FiO2


 


12/18/17 09:18  94 18 141/95 (110) 99 Room Air  


 


12/18/17 08:37 36.2 84 18 165/93 (117) 97 Room Air  


 


12/18/17 07:17 36.9 86 16 147/95 (112) 95 Room Air  











Notes


Mental Status:  alert / awake / arousable, participated in evaluation


Pt Amnestic to Procedure:  Yes


Nausea / Vomiting:  adequately controlled


Pain:  adequately controlled


Airway Patency, RR, SpO2:  stable & adequate


BP & HR:  stable & adequate


Hydration State:  stable & adequate


Anesthetic Complications:  no major complications apparent

## 2017-12-18 NOTE — [facility] OPERATIVE REPORT
Operative Report


Operative Date


Dec 18, 2017.





Pre-Operative Diagnosis





Cataract left eye





Post-Operative Diagnosis





Same





Procedure(s) Performed





Left Cataract Phacoemulsification With Intraocular Lens Implant





Surgeon








Assistant Surgeon(s)


None





Estimated Blood Loss


Zero





Findings


cataract left eye





Fluids (cc crystalloids)


see anesthesia record





Specimens





None





Drains


none





Anesthesia


local with sedation





Complication(s)


None





Disposition


Recovery Room / PACU





Implants


mx60 20.5





Indications


decreased vision left eye





Description of Procedure


After informed consent was obtained in the holding area the patient was


wheeled back to the operating room where cardiac monitoring leads and oxygen


by nasal cannula was administered by Anesthesia. Gentle IV sedation was


given, and the patient's left eye was prepped and draped in usual sterile


fashion. A wire lid speculum was placed into the left eye and the operating


microscope was swung into position. Using 0.12 forceps and a Supersharp blade


a paracentesis port was made 2 o'clock hours away from the 3 o'clock position


of the patient's left eye. 1% non-preserved Lidocaine was then injected into


the anterior chamber for anesthesia.  A 2.0 mm keratotome blade was then used


to make a shelved clear corneal incision at the 3 o'clock position of the


left eye. Amvisc was injected into the anterior chamber and a cystotome and


Utrata forceps were used to perform a curvilinear capsulorrhexis. BSS on a


hydrodissection cannula was used to hydrodissect the lens nucleus away from


the capsular bag. The phacoemulsification handpiece was then used in a stop


and chop fashion to remove the lens nucleus. The irrigation and aspiration


handpiece was then used to remove the residual cortical material. Amvisc was


injected into the capsular bag and anterior chamber and a [employer] MX60


20.5 Diopter intraocular lens was injected into the capsular bag.  Irrigation


and aspiration handpiece was used to remove the residual viscoelastic


material. The wounds were hydrated and noted to be watertight.  The wire lid


speculum was removed from the eye.  Vigamox, Brimonidine, and TobraDex


ointment were placed on the eye and it was shielded.  It should be noted that


EndoCoat was used extensively during the case to protect the cornea endothelium.


 


DISPOSITION:  The patient tolerated the procedure well and was wheeled to the


post anesthesia care unit in stable condition.


I attest to the content of the Intraoperative Record and any orders documented 

therein.  Any exceptions are noted below.


I attest to the content of the Intraoperative Record and any orders documented 

therein.  Any exceptions are noted below.

## 2018-03-29 ENCOUNTER — HOSPITAL ENCOUNTER (OUTPATIENT)
Dept: HOSPITAL 45 - C.LAB1850 | Age: 73
Discharge: HOME | End: 2018-03-29
Attending: INTERNAL MEDICINE
Payer: COMMERCIAL

## 2018-03-29 DIAGNOSIS — E53.8: Primary | ICD-10-CM

## 2018-03-29 DIAGNOSIS — D50.9: ICD-10-CM

## 2018-03-29 DIAGNOSIS — E55.9: ICD-10-CM

## 2018-03-29 DIAGNOSIS — E78.5: ICD-10-CM

## 2018-03-29 DIAGNOSIS — I10: ICD-10-CM

## 2018-03-29 DIAGNOSIS — E03.9: ICD-10-CM

## 2018-03-29 LAB
ALBUMIN SERPL-MCNC: 4.2 GM/DL (ref 3.4–5)
ALP SERPL-CCNC: 85 U/L (ref 45–117)
ALT SERPL-CCNC: 24 U/L (ref 12–78)
AST SERPL-CCNC: 21 U/L (ref 15–37)
BASOPHILS # BLD: 0.04 K/UL (ref 0–0.2)
BASOPHILS NFR BLD: 0.4 %
BUN SERPL-MCNC: 30 MG/DL (ref 7–18)
CALCIUM SERPL-MCNC: 8.7 MG/DL (ref 8.5–10.1)
CO2 SERPL-SCNC: 24 MMOL/L (ref 21–32)
CREAT SERPL-MCNC: 1.26 MG/DL (ref 0.6–1.2)
EOS ABS #: 0.14 K/UL (ref 0–0.5)
EOSINOPHIL NFR BLD AUTO: 309 K/UL (ref 130–400)
GLUCOSE SERPL-MCNC: 95 MG/DL (ref 70–99)
HCT VFR BLD CALC: 36 % (ref 37–47)
HGB BLD-MCNC: 11.6 G/DL (ref 12–16)
IG#: 0.03 K/UL (ref 0–0.02)
IMM GRANULOCYTES NFR BLD AUTO: 13.9 %
KETONES UR QL STRIP: 93 MG/DL
LYMPHOCYTES # BLD: 1.48 K/UL (ref 1.2–3.4)
MCH RBC QN AUTO: 34.9 PG (ref 25–34)
MCHC RBC AUTO-ENTMCNC: 32.2 G/DL (ref 32–36)
MCV RBC AUTO: 108.4 FL (ref 80–100)
MONO ABS #: 0.29 K/UL (ref 0.11–0.59)
MONOCYTES NFR BLD: 2.7 %
NEUT ABS #: 8.63 K/UL (ref 1.4–6.5)
NEUTROPHILS # BLD AUTO: 1.3 %
NEUTROPHILS NFR BLD AUTO: 81.4 %
PH UR: 184 MG/DL (ref 0–200)
PMV BLD AUTO: 8.8 FL (ref 7.4–10.4)
POTASSIUM SERPL-SCNC: 4.7 MMOL/L (ref 3.5–5.1)
PROT SERPL-MCNC: 7.4 GM/DL (ref 6.4–8.2)
RED CELL DISTRIBUTION WIDTH CV: 13.7 % (ref 11.5–14.5)
RED CELL DISTRIBUTION WIDTH SD: 54 FL (ref 36.4–46.3)
SODIUM SERPL-SCNC: 136 MMOL/L (ref 136–145)
WBC # BLD AUTO: 10.61 K/UL (ref 4.8–10.8)

## 2018-07-22 ENCOUNTER — HOSPITAL ENCOUNTER (INPATIENT)
Dept: HOSPITAL 45 - C.EDC | Age: 73
LOS: 2 days | Discharge: HOME | DRG: 372 | End: 2018-07-24
Attending: HOSPITALIST | Admitting: HOSPITALIST
Payer: COMMERCIAL

## 2018-07-22 VITALS
BODY MASS INDEX: 27.03 KG/M2 | WEIGHT: 168.21 LBS | BODY MASS INDEX: 27.03 KG/M2 | WEIGHT: 168.21 LBS | HEIGHT: 66 IN | HEIGHT: 66 IN

## 2018-07-22 VITALS
DIASTOLIC BLOOD PRESSURE: 78 MMHG | TEMPERATURE: 99.14 F | OXYGEN SATURATION: 96 % | HEART RATE: 119 BPM | SYSTOLIC BLOOD PRESSURE: 144 MMHG

## 2018-07-22 DIAGNOSIS — K21.9: ICD-10-CM

## 2018-07-22 DIAGNOSIS — A04.71: Primary | ICD-10-CM

## 2018-07-22 DIAGNOSIS — I10: ICD-10-CM

## 2018-07-22 DIAGNOSIS — Z86.72: ICD-10-CM

## 2018-07-22 DIAGNOSIS — E86.0: ICD-10-CM

## 2018-07-22 DIAGNOSIS — I48.92: ICD-10-CM

## 2018-07-22 DIAGNOSIS — E78.5: ICD-10-CM

## 2018-07-22 DIAGNOSIS — F41.8: ICD-10-CM

## 2018-07-22 DIAGNOSIS — Z87.891: ICD-10-CM

## 2018-07-22 DIAGNOSIS — Z82.49: ICD-10-CM

## 2018-07-22 DIAGNOSIS — N17.9: ICD-10-CM

## 2018-07-22 DIAGNOSIS — I48.0: ICD-10-CM

## 2018-07-22 LAB
ALBUMIN SERPL-MCNC: 3.8 GM/DL (ref 3.4–5)
ALP SERPL-CCNC: 91 U/L (ref 45–117)
ALT SERPL-CCNC: 34 U/L (ref 12–78)
AST SERPL-CCNC: 27 U/L (ref 15–37)
BASOPHILS # BLD: 0.03 K/UL (ref 0–0.2)
BASOPHILS NFR BLD: 0.2 %
BUN SERPL-MCNC: 63 MG/DL (ref 7–18)
CALCIUM SERPL-MCNC: 8.5 MG/DL (ref 8.5–10.1)
CO2 SERPL-SCNC: 17 MMOL/L (ref 21–32)
CREAT SERPL-MCNC: 2.29 MG/DL (ref 0.6–1.2)
EOS ABS #: 0.17 K/UL (ref 0–0.5)
EOSINOPHIL NFR BLD AUTO: 344 K/UL (ref 130–400)
GLUCOSE SERPL-MCNC: 85 MG/DL (ref 70–99)
HCT VFR BLD CALC: 40.5 % (ref 37–47)
HGB BLD-MCNC: 12.7 G/DL (ref 12–16)
IG#: 0.03 K/UL (ref 0–0.02)
IMM GRANULOCYTES NFR BLD AUTO: 10.1 %
LYMPHOCYTES # BLD: 1.23 K/UL (ref 1.2–3.4)
MCH RBC QN AUTO: 34.3 PG (ref 25–34)
MCHC RBC AUTO-ENTMCNC: 31.4 G/DL (ref 32–36)
MCV RBC AUTO: 109.5 FL (ref 80–100)
MONO ABS #: 0.83 K/UL (ref 0.11–0.59)
MONOCYTES NFR BLD: 6.8 %
NEUT ABS #: 9.84 K/UL (ref 1.4–6.5)
NEUTROPHILS # BLD AUTO: 1.4 %
NEUTROPHILS NFR BLD AUTO: 81.3 %
PMV BLD AUTO: 9 FL (ref 7.4–10.4)
POTASSIUM SERPL-SCNC: 4.8 MMOL/L (ref 3.5–5.1)
PROT SERPL-MCNC: 7.5 GM/DL (ref 6.4–8.2)
RED CELL DISTRIBUTION WIDTH CV: 13.9 % (ref 11.5–14.5)
RED CELL DISTRIBUTION WIDTH SD: 54.8 FL (ref 36.4–46.3)
SODIUM SERPL-SCNC: 136 MMOL/L (ref 136–145)
WBC # BLD AUTO: 12.13 K/UL (ref 4.8–10.8)

## 2018-07-22 RX ADMIN — GABAPENTIN SCH MG: 300 CAPSULE ORAL at 23:51

## 2018-07-22 RX ADMIN — FIDAXOMICIN SCH MG: 200 TABLET, FILM COATED ORAL at 23:50

## 2018-07-22 RX ADMIN — LABETALOL HCL SCH MG: 200 TABLET, FILM COATED ORAL at 23:51

## 2018-07-22 RX ADMIN — SODIUM CHLORIDE SCH MLS/HR: 900 INJECTION, SOLUTION INTRAVENOUS at 23:51

## 2018-07-22 RX ADMIN — MIRTAZAPINE SCH MG: 15 TABLET, FILM COATED ORAL at 23:52

## 2018-07-22 NOTE — DIAGNOSTIC IMAGING REPORT
CHEST AND ABDOMEN 2 VIEWS



HISTORY: diarrhea   



COMPARISON:  Chest and abdominal series 2/10/2016.



FINDINGS: The heart is top normal in size. No pleural effusions. No

pneumothorax. Mild diffuse interstitial thickening most pronounced at the lung

bases. This remains unchanged and is likely chronic. No new focal lung

consolidations to suggest pneumonia. No evidence for pulmonary edema.

Dextroscoliosis of the lumbar spine. No pneumoperitoneum. No pneumatosis. No

renal or ureteral calculi. Nondilated gas-filled loops of large and small bowel

seen throughout the abdomen. No evidence for bowel obstruction. Small to

moderate amount of well-formed stool within the colon.



IMPRESSION:  



1. Stable mild chronic interstitial thickening. No acute process within the

chest.

2. Unremarkable bowel gas pattern. No evidence for bowel obstruction. 









Electronically signed by:  Amandeep Enrique M.D.

7/22/2018 4:07 PM



Dictated Date/Time:  7/22/2018 4:03 PM

## 2018-07-22 NOTE — EMERGENCY ROOM VISIT NOTE
History


Report prepared by Alexis:  Carolyn Jay


Under the Supervision of:  Dr. Montrell Hines D.O.


First contact with patient:  14:31


Stated Complaint:  GEN ILLNESS





History of Present Illness


The patient is a 73 year old female who presents to the Emergency Room with 

complaints of worsening generalized illness beginning about a week ago. The 

patient notes diarrhea, a fever, and generalized weakness. She notes on only 

drinking water today. The patient lives at home by herself. The patient states 

she uses a walker to ambulate. The patient is concerned she has C.Diff due to 

her history of C.Diff 2 years ago. The patient has a history of neuropathy.





   Source of History:  patient


   Onset:  a week ago


   Position:  other (generalized)


   Quality:  other (illness)


   Timing:  worsening


   Associated Symptoms:  + fevers, + diarrhea, + weakness





Review of Systems


See HPI for pertinent positives & negatives. A total of 10 systems reviewed and 

were otherwise negative.





Past Medical & Surgical


Medical Problems:


(1) Altered mental status


(2) Arthritis


(3) Atrial flutter


(4) Bimalleolar fracture of right ankle


(5) Clostridium difficile colitis


(6) Confusion


(7) Fracture dislocation of right ankle joint


(8) Hypertension


(9) Metabolic acidosis


(10) Sepsis associated hypotension


(11) Sinusitis


(12) Trimalleolar fracture of ankle, closed








Family History





Heart disease


Hypertension





Social History


Smoking Status:  Former Smoker


Alcohol Use:  occasionally


Drug Use:  none


Marital Status:  


Housing Status:  lives alone


Occupation Status:  retired





Current/Historical Medications


Scheduled


Ascorbic Acid (Vitamin C), 1 TAB PO QAM


Aspirin (Aspirin Ec), 81 MG PO QAM


Atorvastatin (Lipitor), 40 MG PO HS


Calcium/Vitamin D (Os-Jhonny 500 Plus D), 1 TAB PO BID


Cholecalciferol (Vitamin D), 1 TAB PO QPM


Cyanocobalamin (Vitamin B-12), 1,000 MCG PO QAM


Fenofibrate (Fenofibrate), 48 MG PO QAM


Gabapentin (Neurontin), 300 MG PO TID


Labetalol Hcl (Labetalol Hcl), 200 MG PO BID


Levothyroxine Sodium (Synthroid), 88 MCG PO QAM


Mirtazapine (Mirtazapine), 7.5 MG PO HS


Omeprazole (Prilosec), 20 MG PO QAM


Probiotic Product (Probiotic), 1 CAP PO QAM


Vancomycin Hcl (Vancomycin), 1 CAP PO QID





Allergies


Coded Allergies:  


     Sulfa Antibiotics (Verified  Allergy, Severe, STOMACH SWELLS; STOMACH 

BLOCKAGE? PER PATIENT, 7/22/18)





Physical Exam


Vital Signs











  Date Time  Temp Pulse Resp B/P (MAP) Pulse Ox O2 Delivery O2 Flow Rate FiO2


 


7/22/18 18:15  99 16 142/101 98 Room Air  


 


7/22/18 17:27  102      


 


7/22/18 17:11  101 16 120/80 98 Room Air  


 


7/22/18 15:35  98 16 120/89 96 Room Air  


 


7/22/18 14:37 36.9 112 16 111/87 95 Room Air  











Physical Exam


GENERAL:  Patient is awake, alert, and in no acute distress. Patient is resting 

comfortably and showing no signs of anxiety


EYES: The conjunctivae are clear.  The pupils are round and reactive. 


EARS, NOSE, MOUTH AND THROAT: The nose is without any evidence of any 

deformity. Mucous membranes are moist. Tongue is midline 


NECK: The neck is nontender and supple.


RESPIRATORY: Normal respiratory effort is noted. There is no evidence of 

wheezing rhonchi or rales to auscultation. 


CARDIOVASCULAR:  Regular rate and rhythm noted. There no murmurs rubs or 

gallops normal S1 normal S2 


GASTROINTESTINAL: The abdomen is soft. Bowel sounds are present in all 

quadrants. Abdomen is nontender. 


MUSCULOSKELETAL/EXTREMITIES: There is no evidence of gross deformity. Full 

range of motion is noted in the hips and shoulders. 


SKIN: Trace pedal edema bilateral lower extremities. Mild venous stasis changes 

noted in both legs. There is no obvious evidence of any rash. There are no 

petechiae, pallor or cyanosis noted. 


NEUROLOGIC:  Patient is awake alert and oriented x3.





Medical Decision & Procedures


ER Provider


Diagnostic Interpretation:


Radiology results as stated below per my review and radiologist interpretation:





CHEST AND ABDOMEN 2 VIEWS





HISTORY: diarrhea   





COMPARISON:  Chest and abdominal series 2/10/2016.





FINDINGS: The heart is top normal in size. No pleural effusions. No


pneumothorax. Mild diffuse interstitial thickening most pronounced at the lung


bases. This remains unchanged and is likely chronic. No new focal lung


consolidations to suggest pneumonia. No evidence for pulmonary edema.


Dextroscoliosis of the lumbar spine. No pneumoperitoneum. No pneumatosis. No


renal or ureteral calculi. Nondilated gas-filled loops of large and small bowel


seen throughout the abdomen. No evidence for bowel obstruction. Small to


moderate amount of well-formed stool within the colon.





IMPRESSION:  





1. Stable mild chronic interstitial thickening. No acute process within the


chest.


2. Unremarkable bowel gas pattern. No evidence for bowel obstruction. 














Electronically signed by:  Amandeep Enrique M.D.





Laboratory Results


7/22/18 14:53








Red Blood Count 3.70, Mean Corpuscular Volume 109.5, Mean Corpuscular 

Hemoglobin 34.3, Mean Corpuscular Hemoglobin Concent 31.4, Mean Platelet Volume 

9.0, Neutrophils (%) (Auto) 81.3, Lymphocytes (%) (Auto) 10.1, Monocytes (%) (

Auto) 6.8, Eosinophils (%) (Auto) 1.4, Basophils (%) (Auto) 0.2, Neutrophils # (

Auto) 9.84, Lymphocytes # (Auto) 1.23, Monocytes # (Auto) 0.83, Eosinophils # (

Auto) 0.17, Basophils # (Auto) 0.03





7/22/18 14:53

















Test


  7/22/18


14:53


 


White Blood Count


  12.13 K/uL


(4.8-10.8)


 


Red Blood Count


  3.70 M/uL


(4.2-5.4)


 


Hemoglobin


  12.7 g/dL


(12.0-16.0)


 


Hematocrit 40.5 % (37-47) 


 


Mean Corpuscular Volume


  109.5 fL


()


 


Mean Corpuscular Hemoglobin


  34.3 pg


(25-34)


 


Mean Corpuscular Hemoglobin


Concent 31.4 g/dl


(32-36)


 


Platelet Count


  344 K/uL


(130-400)


 


Mean Platelet Volume


  9.0 fL


(7.4-10.4)


 


Neutrophils (%) (Auto) 81.3 % 


 


Lymphocytes (%) (Auto) 10.1 % 


 


Monocytes (%) (Auto) 6.8 % 


 


Eosinophils (%) (Auto) 1.4 % 


 


Basophils (%) (Auto) 0.2 % 


 


Neutrophils # (Auto)


  9.84 K/uL


(1.4-6.5)


 


Lymphocytes # (Auto)


  1.23 K/uL


(1.2-3.4)


 


Monocytes # (Auto)


  0.83 K/uL


(0.11-0.59)


 


Eosinophils # (Auto)


  0.17 K/uL


(0-0.5)


 


Basophils # (Auto)


  0.03 K/uL


(0-0.2)


 


RDW Standard Deviation


  54.8 fL


(36.4-46.3)


 


RDW Coefficient of Variation


  13.9 %


(11.5-14.5)


 


Immature Granulocyte % (Auto) 0.2 % 


 


Immature Granulocyte # (Auto)


  0.03 K/uL


(0.00-0.02)


 


Anion Gap


  12.0 mmol/L


(3-11)


 


Est Creatinine Clear Calc


Drug Dose 21.6 ml/min 


 


 


Estimated GFR (


American) 23.4 


 


 


Estimated GFR (Non-


American 20.2 


 


 


BUN/Creatinine Ratio 27.5 (10-20) 


 


Calcium Level


  8.5 mg/dl


(8.5-10.1)


 


Magnesium Level


  2.4 mg/dl


(1.8-2.4)


 


Total Bilirubin


  0.4 mg/dl


(0.2-1)


 


Direct Bilirubin


  0.2 mg/dl


(0-0.2)


 


Aspartate Amino Transf


(AST/SGOT) 27 U/L (15-37) 


 


 


Alanine Aminotransferase


(ALT/SGPT) 34 U/L (12-78) 


 


 


Alkaline Phosphatase


  91 U/L


()


 


Total Protein


  7.5 gm/dl


(6.4-8.2)


 


Albumin


  3.8 gm/dl


(3.4-5.0)





Laboratory results per my review.





Medications Administered











 Medications


  (Trade)  Dose


 Ordered  Sig/Nayeli


 Route  Start Time


 Stop Time Status Last Admin


Dose Admin


 


 Sodium Chloride  1,000 ml @ 


 999 mls/hr  Q1H1M STAT


 IV  7/22/18 14:36


 7/22/18 15:36 DC 7/22/18 14:53


999 MLS/HR


 


 Sodium Chloride  1,000 ml @ 


 999 mls/hr  Q1H1M STAT


 IV  7/22/18 18:24


 7/22/18 19:24 DC 7/22/18 18:30


999 MLS/HR


 


 Vancomycin HCl


  (Vancomycin Oral


 Soln)  250 mg  ONE  STAT


 PO  7/22/18 19:14


 7/22/18 19:16 DC 7/22/18 19:32


250 MG











ED Course


1431: The patient was evaluated in room C9. A complete history and physical 

examination were performed. 





1436: Ordered NSS 1,000 ml @ 999 mls/hr IV.





1824: Ordered NSS 1,000 ml @ 999 mls/hr IV





1913: I updated the patient on her test results.





1914: Ordered Vancomycin HCl 250 mg PO. 





1938: I discussed the patient's case with Dr. Cain. The patient 

will be evaluated for further management.





Medical Decision


Differential diagnosis:


Etiologies such as metabolic, infection, hypo/hyperglycemia, electrolyte 

abnormalities, cardiac sources, intracerebral event, toxicologic, neurologic, 

as well as others were entertained. 





Nursing notes reviewed.





Patient's previous electronic medical records reviewed.





The patient is a 73-year-old female who presented to the emergency department 

for an evaluation of diarrhea.  The patient's had diarrhea for the last few 

days.  She started having blood in her bowel movements.  Her stool was grossly 

bloody and heme positive.  Studies revealed that her bicarbonate was low and 

her BUN and creatinine were elevated.  Her white blood cell count was also 

elevated.  Her abdominal exam was not consistent with an acute surgical abdomen 

however her C. difficile toxin was positive.  She was treated with IV fluids as 

well as vancomycin orally in the emergency department.  I discussed the patient'

s laboratory and radiographic studies with her.  Because of her bleeding and 

other laboratory findings I did discuss her case with the on-call [facility] 

hospitalist.  They have agreed to evaluate the patient in the emergency 

department for further management and disposition.





Medication Reconcilliation


Current Medication List:  was personally reviewed by me





Blood Pressure Screening


Patient's blood pressure:  Elevated blood pressure


Blood pressure disposition:  Referred to PCP (referred to hospitalist)





Consults


Time Called:  1935


Consulting Physician:  Dr. Cain


Returned Call:  [phone]


I discussed the patient's case with Dr. Cain. The patient will 

be evaluated for further management.





Impression





 Primary Impression:  


 Clostridium difficile colitis


 Additional Impressions:  


 GI bleed


 Acute kidney injury


 Dehydration





Scribe Attestation


The scribe's documentation has been prepared under my direction and personally 

reviewed by me in its entirety. I confirm that the note above accurately 

reflects all work, treatment, procedures, and medical decision making performed 

by me.





Departure Information


Dispostion


Being Evaluated By Hospitalist





Prescriptions





Vancomycin Hcl (Vancomycin) 250 Mg Cap


1 CAP PO QID, #40 CAP


   Prov: Montrell Hines,          7/22/18





Referrals


BRANDEE Andujar MD (PCP)





Problem Qualifiers

## 2018-07-22 NOTE — HISTORY AND PHYSICAL
History & Physical


Date & Time of Service:


Jul 22, 2018 at 21:04


Chief Complaint:


Gen Illness


Primary Care Physician:


BRANDEE Andujar MD


History of Present Illness


72 yo F with Pmhx of multiple episodes of C diff ( 3 in total) , HTN, Afib, CVA

, Afib. Patient reports  acute intermittent diarrhea x 1week, 2-3 watery stools

  day with red blood in stool on occasion. She reports nausea w/o vomiting 

subjective fever, chills, lightheadedness diaphoresis, headache, generalized 

weakness, dec'd appetite.  She denies abdominal pain. fatigue, cough, sob, 

chest pain.





Patient has a history of multiple occurrences of C diff diarrhea. one previous 

case  of diarrhea involved dany requiring dialysis 3 years ago.





Patient arrived afebrile , tachycardic , WBC ct ct of 12.13, Elevated CR 

2.29, normal at baseline, Cdiff positive stool, heme positive stool, thought no 

evidence of anemia.  CT abdomen showing stable mild chronic interstitial 

thickening without acute process. She was given 250 mg Oral Vanc along with a 1 

L NS bolus x2.





Past Medical/Surgical History


Medical Problems:


(1) Accidental overdose


(2) Acute renal failure


(3) Acute renal failure


(4) Altered mental status


(5) Altered mental status


(6) Arthritis


(7) Atrial flutter


(8) Bimalleolar fracture of right ankle


(9) Clostridium difficile colitis


(10) Confusion


(11) Dehydration


(12) Dehydration


(13) Diarrhea


(14) Fall


(15) Fracture dislocation of right ankle joint


(16) Hypertension


(17) Hypertensive urgency


(18) Hypotension


(19) Metabolic acidosis


(20) Metabolic acidosis


(21) Recurrent Clostridium difficile diarrhea


(22) Rhabdomyolysis


(23) Sepsis associated hypotension


(24) Sinusitis


(25) SIRS (systemic inflammatory response syndrome)


(26) Syncope


(27) Tachycardia, unspecified


(28) Tachycardia, unspecified


(29) Trimalleolar fracture of ankle, closed








Family History





Heart disease


Hypertension





Social History


Smoking Status:  Never Smoker


Drug Use:  none


Marital Status:  


Housing status:  lives alone


Occupational Status:  retired





Immunizations


History of Influenza Vaccine:  Yes


History of Tetanus Vaccine?:  Yes


History of Pneumococcal:  Yes


History of Hepatitis B Vaccine:  No





Allergies


Coded Allergies:  


     Sulfa Antibiotics (Verified  Allergy, Severe, STOMACH SWELLS; STOMACH 

BLOCKAGE? PER PATIENT, 7/22/18)





Home Medications


Scheduled


Ascorbic Acid (Vitamin C), 1 TAB PO QAM


Aspirin (Aspirin Ec), 81 MG PO QAM


Atorvastatin (Lipitor), 40 MG PO HS


Calcium/Vitamin D (Os-Jhonny 500 Plus D), 1 TAB PO BID


Cholecalciferol (Vitamin D), 1 TAB PO QPM


Cyanocobalamin (Vitamin B-12), 1,000 MCG PO QAM


Fenofibrate (Fenofibrate), 48 MG PO QAM


Gabapentin (Neurontin), 300 MG PO TID


Labetalol Hcl (Labetalol Hcl), 200 MG PO BID


Levothyroxine Sodium (Synthroid), 88 MCG PO QAM


Mirtazapine (Mirtazapine), 7.5 MG PO HS


Omeprazole (Prilosec), 20 MG PO QAM


Probiotic Product (Probiotic), 1 CAP PO QAM


Vancomycin Hcl (Vancomycin), 1 CAP PO QID





Review of Systems


Constitutional:  No fever, No chills, No weakness


Respiratory:  No cough, No sputum, No shortness of breath


Abdomen:  + nausea, + diarrhea, No pain, No vomiting


Genitourinary - Female:  No dysuria, No urinary frequency, No urinary urgency


Neurologic:  No weakness, No numbness/tingling


Integumentary:  No rash, No itch





Physical Exam


Vital Signs











  Date Time  Temp Pulse Resp B/P (MAP) Pulse Ox O2 Delivery O2 Flow Rate FiO2


 


7/22/18 19:46  105 16 157/84 95 Room Air  


 


7/22/18 18:15  99 16 142/101 98 Room Air  


 


7/22/18 17:27  102      


 


7/22/18 17:11  101 16 120/80 98 Room Air  


 


7/22/18 15:35  98 16 120/89 96 Room Air  


 


7/22/18 14:37 36.9 112 16 111/87 95 Room Air  








GENERAL: alert, well appearing, well nourished, no distress


EYE EXAM: normal conjunctiva, PERRL and EOM's grossly intact


OROPHARYNX: no exudate, no erythema, lips, buccal mucosa, and tongue normal and 

mucous membranes are moist


NECK: supple, no nuchal rigidity, no adenopathy, non-tender


LUNGS: Clear to auscultation. Normal chest wall mechanics


HEART:  S1 normal and S2 normal 


ABDOMEN: abdomen soft, non-tender, normo-active bowel sounds, no masses, no 

rebound or guarding. 


BACK: Back is symmetrical on inspection and there is no deformity


SKIN: no rashes and no bruising 


UPPER EXTREMITIES: upper extremities are grossly normal. 


LOWER EXTREMITIES: No pitting edema.


NEURO EXAM: Normal sensorium, cranial nerves II-XII grossly intact, normal 

speech,  no gross weakness of arms, no gross weakness of legs.





Diagnostics


Laboratory Results





Results Past 24 Hours








Test


  7/22/18


14:53 Range/Units


 


 


White Blood Count 12.13 4.8-10.8  K/uL


 


Red Blood Count 3.70 4.2-5.4  M/uL


 


Hemoglobin 12.7 12.0-16.0  g/dL


 


Hematocrit 40.5 37-47  %


 


Mean Corpuscular Volume 109.5   fL


 


Mean Corpuscular Hemoglobin 34.3 25-34  pg


 


Mean Corpuscular Hemoglobin


Concent 31.4


  32-36  g/dl


 


 


Platelet Count 344 130-400  K/uL


 


Mean Platelet Volume 9.0 7.4-10.4  fL


 


Neutrophils (%) (Auto) 81.3  %


 


Lymphocytes (%) (Auto) 10.1  %


 


Monocytes (%) (Auto) 6.8  %


 


Eosinophils (%) (Auto) 1.4  %


 


Basophils (%) (Auto) 0.2  %


 


Neutrophils # (Auto) 9.84 1.4-6.5  K/uL


 


Lymphocytes # (Auto) 1.23 1.2-3.4  K/uL


 


Monocytes # (Auto) 0.83 0.11-0.59  K/uL


 


Eosinophils # (Auto) 0.17 0-0.5  K/uL


 


Basophils # (Auto) 0.03 0-0.2  K/uL


 


RDW Standard Deviation 54.8 36.4-46.3  fL


 


RDW Coefficient of Variation 13.9 11.5-14.5  %


 


Immature Granulocyte % (Auto) 0.2  %


 


Immature Granulocyte # (Auto) 0.03 0.00-0.02  K/uL


 


Sodium Level 136 136-145  mmol/L


 


Potassium Level 4.8 3.5-5.1  mmol/L


 


Chloride Level 107   mmol/L


 


Carbon Dioxide Level 17 21-32  mmol/L


 


Anion Gap 12.0 3-11  mmol/L


 


Blood Urea Nitrogen 63 7-18  mg/dl


 


Creatinine


  2.29


  0.60-1.20


mg/dl


 


Est Creatinine Clear Calc


Drug Dose 21.6


   ml/min


 


 


Estimated GFR (


American) 23.4


   


 


 


Estimated GFR (Non-


American 20.2


   


 


 


BUN/Creatinine Ratio 27.5 10-20  


 


Random Glucose 85 70-99  mg/dl


 


Calcium Level 8.5 8.5-10.1  mg/dl


 


Magnesium Level 2.4 1.8-2.4  mg/dl


 


Total Bilirubin 0.4 0.2-1  mg/dl


 


Direct Bilirubin 0.2 0-0.2  mg/dl


 


Aspartate Amino Transf


(AST/SGOT) 27


  15-37  U/L


 


 


Alanine Aminotransferase


(ALT/SGPT) 34


  12-78  U/L


 


 


Alkaline Phosphatase 91   U/L


 


Total Protein 7.5 6.4-8.2  gm/dl


 


Albumin 3.8 3.4-5.0  gm/dl








Microbiology Results


7/22/18 C.difficile Toxin B Gene (PCR), Rafael Batch


          Pending


7/22/18 Shiga Toxin Test, Rafael Batch


          Pending


7/22/18 Stool Culture, Rafael Batch


          Pending


7/22/18 WBC Smear, Rafael Batch


          Pending


7/22/18 C.difficile Toxin B Gene (PCR) - Final, Complete


          Positive for C. difficile toxin B gene


7/22/18 Shiga Toxin Test, Received


          Pending


7/22/18 Stool Culture, Received


          Pending


7/22/18 WBC Smear - Preliminary, Resulted





Diagnostic Radiology





CHEST AND ABDOMEN 2 VIEWS





HISTORY: diarrhea   





COMPARISON:  Chest and abdominal series 2/10/2016.





FINDINGS: The heart is top normal in size. No pleural effusions. No


pneumothorax. Mild diffuse interstitial thickening most pronounced at the lung


bases. This remains unchanged and is likely chronic. No new focal lung


consolidations to suggest pneumonia. No evidence for pulmonary edema.


Dextroscoliosis of the lumbar spine. No pneumoperitoneum. No pneumatosis. No


renal or ureteral calculi. Nondilated gas-filled loops of large and small bowel


seen throughout the abdomen. No evidence for bowel obstruction. Small to


moderate amount of well-formed stool within the colon.





IMPRESSION:  





1. Stable mild chronic interstitial thickening. No acute process within the


chest.


2. Unremarkable bowel gas pattern. No evidence for bowel obstruction.





Impression


Assessment and Plan


72 yo F with Pmhx of multiple episodes of C diff (4 in total) , HTN, Afib, CVA, 

Afib. Patient reports  acute intermittent diarrhea x 1week, 2-3 watery stools  

day with red blood in stool on occasion








 Acute Diarrhea, Heme positive stool 


- likely secondary to Recurrent Cdiff,


- presentation marks 4th presentation with Cdiff Diarrhea


- Has been treated  with oral Vancomycin. Given recurrences, will Start 

Fidaxomicin PO alternatively


- not on abx on arrival, patient has been on PPI which may increase risk of C 

diff, held on arrival


- IV NS at 80 mls/hr


- F/u  Stool cx's


- Blood in stool: not anemic on arrival, red blood in stool in ED. fecal occult 

positive


- XR Abdomen unremarkable


- Continue to monitor CBC daily








HTN


- BP controlled 


- Restart home  Labetalol 200 mg BID





Tachycardia 


- appears sinus possibly secondary to dehydration, BP normal


- F/u EKG 


- IV hydration as above





Paroxysmal Atrial Fibrillation


-not currently in Afib


- Rate control with Labetalol


- not on anticoagulation


- Continue  Aspirin





HLD


- Lipitor, Fenofibrate








GERD


-Held Prilosec, consider discontinuing finding alternative  on discharge due to 

C diff risk


 patient denies GERD symptoms at present





Depression Anxiety


-Mirtazapine








Disposition


-Admit Med Surg





DVT PPX; SCD's





Code status


FULL





Resuscitation Status








VTE Prophylaxis


Will order VTE Prophylaxis:  Yes





Note


Total Time:   Critical Care 30 - 74 minutes





History


Patient seen and examined, chart reviewed, case discussed with Dr. Sargent and I 

agree with his assessment and plan as documented above.  Briefly, patient is a 

74yo female with history of recurrent c. difficile infection.  Her first 

episode was in 2015 complicated by septic shock, DANY requiring HD and ICU care.

  She had two additional episodes since then. Prior treatments to include Flagyl

, Vanc and prolonged taper.  She has seen ID in the past.  Patient with 

intermittent diarrhea x 3 weeks, acutely worsened over the last week.  Also 

with subjective fevers/chills/poor appetite.  C.diff +





On physical exam she is afebrile, mildly tachycardic at 110 bpm during my 

encounter.  Pleasant in NAD


HEENT with dry oral mucosa , right cochlear implant in place, otherwise negative


Heart +S1/S2, regular, tachycardic


Lungs CTA


Abd soft, nontender, nondistended, tympanic to percussion, no masses/

organomegaly or ascites


Ext warm, well perfused 





Labs and images reviewed - significant for elevated WBC at 12.13, Macrocytosis 

with PFD=852.5, elevated BUN=63, Cr=2.29 with relatively normal baseline/

episodes of DANY, HCO3 low at 17


CT of the abdomen performed which was unremarkable





Assessment/Plan - 74yo female with recurrent C.diff presenting with the same.  

Moderate severity based on Cr of 2.29


-Fidaxomicin 200mg po BID x 10 days


-IV hydration, avoid nephrotoxic agents, renal dosing where appropriate, 

monitor BUN/Cr/electrolytes


-Macrocytosis was worked up prior with normal B12, Folate and MMA


-Remainder of plan as above

## 2018-07-23 VITALS — SYSTOLIC BLOOD PRESSURE: 111 MMHG | HEART RATE: 120 BPM | DIASTOLIC BLOOD PRESSURE: 69 MMHG

## 2018-07-23 VITALS
DIASTOLIC BLOOD PRESSURE: 68 MMHG | OXYGEN SATURATION: 94 % | SYSTOLIC BLOOD PRESSURE: 108 MMHG | HEART RATE: 108 BPM | TEMPERATURE: 98.6 F

## 2018-07-23 VITALS
HEART RATE: 119 BPM | SYSTOLIC BLOOD PRESSURE: 120 MMHG | DIASTOLIC BLOOD PRESSURE: 61 MMHG | OXYGEN SATURATION: 94 % | TEMPERATURE: 98.6 F

## 2018-07-23 VITALS
SYSTOLIC BLOOD PRESSURE: 133 MMHG | TEMPERATURE: 98.6 F | HEART RATE: 102 BPM | OXYGEN SATURATION: 96 % | DIASTOLIC BLOOD PRESSURE: 57 MMHG

## 2018-07-23 VITALS — OXYGEN SATURATION: 94 %

## 2018-07-23 VITALS — HEART RATE: 120 BPM | DIASTOLIC BLOOD PRESSURE: 69 MMHG | OXYGEN SATURATION: 95 % | SYSTOLIC BLOOD PRESSURE: 107 MMHG

## 2018-07-23 LAB
ALBUMIN SERPL-MCNC: 2.8 GM/DL (ref 3.4–5)
ALP SERPL-CCNC: 69 U/L (ref 45–117)
ALT SERPL-CCNC: 34 U/L (ref 12–78)
AST SERPL-CCNC: 51 U/L (ref 15–37)
BASOPHILS # BLD: 0.02 K/UL (ref 0–0.2)
BASOPHILS NFR BLD: 0.2 %
BUN SERPL-MCNC: 41 MG/DL (ref 7–18)
CALCIUM SERPL-MCNC: 7.9 MG/DL (ref 8.5–10.1)
CO2 SERPL-SCNC: 13 MMOL/L (ref 21–32)
CREAT SERPL-MCNC: 1.27 MG/DL (ref 0.6–1.2)
EOS ABS #: 0.17 K/UL (ref 0–0.5)
EOSINOPHIL NFR BLD AUTO: 262 K/UL (ref 130–400)
GLUCOSE SERPL-MCNC: 62 MG/DL (ref 70–99)
HCT VFR BLD CALC: 34.2 % (ref 37–47)
HGB BLD-MCNC: 10.9 G/DL (ref 12–16)
IG#: 0.04 K/UL (ref 0–0.02)
IMM GRANULOCYTES NFR BLD AUTO: 17.4 %
INR PPP: 1 (ref 0.9–1.1)
LYMPHOCYTES # BLD: 1.89 K/UL (ref 1.2–3.4)
MCH RBC QN AUTO: 35.2 PG (ref 25–34)
MCHC RBC AUTO-ENTMCNC: 31.9 G/DL (ref 32–36)
MCV RBC AUTO: 110.3 FL (ref 80–100)
MONO ABS #: 0.56 K/UL (ref 0.11–0.59)
MONOCYTES NFR BLD: 5.2 %
NEUT ABS #: 8.19 K/UL (ref 1.4–6.5)
NEUTROPHILS # BLD AUTO: 1.6 %
NEUTROPHILS NFR BLD AUTO: 75.2 %
PMV BLD AUTO: 8.9 FL (ref 7.4–10.4)
POTASSIUM SERPL-SCNC: 4.4 MMOL/L (ref 3.5–5.1)
PROT SERPL-MCNC: 6 GM/DL (ref 6.4–8.2)
RED CELL DISTRIBUTION WIDTH CV: 14 % (ref 11.5–14.5)
RED CELL DISTRIBUTION WIDTH SD: 56.1 FL (ref 36.4–46.3)
SODIUM SERPL-SCNC: 141 MMOL/L (ref 136–145)
WBC # BLD AUTO: 10.87 K/UL (ref 4.8–10.8)

## 2018-07-23 RX ADMIN — MIRTAZAPINE SCH MG: 15 TABLET, FILM COATED ORAL at 21:01

## 2018-07-23 RX ADMIN — LACTOBACILLUS TAB SCH TAB: TAB at 07:55

## 2018-07-23 RX ADMIN — GABAPENTIN SCH MG: 300 CAPSULE ORAL at 13:51

## 2018-07-23 RX ADMIN — ACETAMINOPHEN PRN MG: 325 TABLET ORAL at 09:23

## 2018-07-23 RX ADMIN — LABETALOL HCL SCH MG: 200 TABLET, FILM COATED ORAL at 07:57

## 2018-07-23 RX ADMIN — SODIUM CHLORIDE SCH MLS/HR: 900 INJECTION, SOLUTION INTRAVENOUS at 09:17

## 2018-07-23 RX ADMIN — ATORVASTATIN CALCIUM SCH MG: 40 TABLET, FILM COATED ORAL at 09:01

## 2018-07-23 RX ADMIN — LEVOTHYROXINE SODIUM SCH MCG: 88 TABLET ORAL at 06:09

## 2018-07-23 RX ADMIN — FIDAXOMICIN SCH MG: 200 TABLET, FILM COATED ORAL at 07:56

## 2018-07-23 RX ADMIN — SODIUM CHLORIDE SCH MLS/HR: 900 INJECTION, SOLUTION INTRAVENOUS at 20:55

## 2018-07-23 RX ADMIN — ACETAMINOPHEN PRN MG: 325 TABLET ORAL at 00:11

## 2018-07-23 RX ADMIN — FIDAXOMICIN SCH MG: 200 TABLET, FILM COATED ORAL at 21:00

## 2018-07-23 RX ADMIN — FENOFIBRATE SCH MG: 48 TABLET, FILM COATED ORAL at 07:53

## 2018-07-23 RX ADMIN — GABAPENTIN SCH MG: 300 CAPSULE ORAL at 07:54

## 2018-07-23 RX ADMIN — LABETALOL HCL SCH MG: 200 TABLET, FILM COATED ORAL at 21:02

## 2018-07-23 RX ADMIN — ACETAMINOPHEN PRN MG: 325 TABLET ORAL at 21:09

## 2018-07-23 RX ADMIN — GABAPENTIN SCH MG: 300 CAPSULE ORAL at 21:01

## 2018-07-23 NOTE — FAMILY MEDICINE PROGRESS NOTE
Progress Note


Date of Service


Jul 23, 2018.





Subjective


Pt resting in bed this morning, reports mild back pain. States has neuropathy 

of lower extremities and fingertips. Denies abdominal pain currently. Denies 

any stooling this morning. Per nursing, there is a healing wound on her right 

second toe. Pt admits she stubbed it in the kitchen the other day. 





ROS


See HPI for pertinent positives and negatives. Otherwise denies new headache, 

vision change, chest pain, dyspnea, abdominal pain, dysuria, or numbness 

tingling in extremities.





Medications





Current Inpatient Medications








 Medications


  (Trade)  Dose


 Ordered  Sig/Nayeli


 Route  Start Time


 Stop Time Status Last Admin


Dose Admin


 


 Acetaminophen


  (Tylenol Tab)  650 mg  Q4H  PRN


 PO  7/22/18 21:00


 8/21/18 20:59  7/23/18 09:23


650 MG


 


 Al Hydrox/Mg


 Hydrox/Simethicone


  (Maalox Max Susp)  15 ml  Q4H  PRN


 PO  7/22/18 21:00


 8/21/18 20:59   


 


 


 Ondansetron HCl


  (Zofran Inj)  4 mg  Q6H  PRN


 IV  7/22/18 21:00


 8/21/18 20:59   


 


 


 Sodium Chloride  1,000 ml @ 


 100 mls/hr  Q10H


 IV  7/22/18 21:00


 8/21/18 20:59  7/23/18 09:17


100 MLS/HR


 


 Fidaxomicin


  (Dificid Tab)  200 mg  BID


 PO  7/22/18 21:00


 8/1/18 20:59  7/23/18 07:56


200 MG


 


 Atorvastatin


 Calcium


  (Lipitor Tab)  40 mg  QAM


 PO  7/23/18 09:00


 8/22/18 08:59  7/23/18 09:01


40 MG


 


 Fenofibrate


  (Tricor Tab)  48 mg  QAM


 PO  7/23/18 09:00


 8/22/18 08:59  7/23/18 07:53


48 MG


 


 Gabapentin


  (Neurontin Cap)  300 mg  TID


 PO  7/22/18 21:00


 8/21/18 20:59  7/23/18 07:54


300 MG


 


 Labetalol HCl


  (Normodyne Tab)  200 mg  BID


 PO  7/22/18 21:00


 8/21/18 20:59  7/23/18 07:57


200 MG


 


 Levothyroxine


 Sodium


  (Synthroid Tab)  88 mcg  DAILYBB


 PO  7/23/18 06:30


 8/22/18 06:29  7/23/18 06:09


88 MCG


 


 Mirtazapine


  (Remeron Tab)  7.5 mg  HS


 PO  7/22/18 21:00


 8/21/18 20:59  7/22/18 23:52


7.5 MG


 


 Lactobacillus


 Acidophilus


  (Floranex Tab)  4 tab  QAM


 PO  7/23/18 09:00


 8/22/18 08:59  7/23/18 07:55


4 TAB











Objective


Vital Signs











  Date Time  Temp Pulse Resp B/P (MAP) Pulse Ox O2 Delivery O2 Flow Rate FiO2


 


7/23/18 08:00     94 Room Air  


 


7/23/18 07:49  120  111/69 (83)    


 


7/23/18 07:03 37.0 108 22 108/68 (81) 94 Room Air  


 


7/23/18 00:00      Room Air  


 


7/23/18 00:00  120 20 107/69 (82) 95 Room Air  


 


7/22/18 23:06      Room Air  


 


7/22/18 22:01 37.3 119 18 144/78 (100) 96   


 


7/22/18 21:39  107 16 166/88 96   


 


7/22/18 19:46  105 16 157/84 95 Room Air  


 


7/22/18 18:15  99 16 142/101 98 Room Air  


 


7/22/18 17:27  102      


 


7/22/18 17:11  101 16 120/80 98 Room Air  


 


7/22/18 15:35  98 16 120/89 96 Room Air  


 


7/22/18 14:37 36.9 112 16 111/87 95 Room Air  











Physical Exam


Notes:


GENERAL: Awake, alert, appears stated age, in no distress


HENT: Normocephalic, atraumatic.  Moist mucous membranes


EYES: Normal conjunctiva. Sclera non-icteric. EOMI. 


NECK: Supple. Full range of motion. no JVD


RESPIRATORY: Clear to auscultation.


CARDIAC: Regular rate, normal rhythm. Extremities warm and well perfused. 

Pulses equal.


ABDOMEN: Soft, non-distended. No tenderness to palpation. No rebound or 

guarding. No masses. Normoactive BS.


LOWER EXTREMITIES: Calves are equal size bilaterally and non-tender. No edema. 

No discoloration. 


NEURO: No motor deficits noted. 


SKIN: No rash or jaundice noted.





Laboratory Results


7/23/18 05:32








Red Blood Count 3.10, Mean Corpuscular Volume 110.3, Mean Corpuscular 

Hemoglobin 35.2, Mean Corpuscular Hemoglobin Concent 31.9, Mean Platelet Volume 

8.9, Neutrophils (%) (Auto) 75.2, Lymphocytes (%) (Auto) 17.4, Monocytes (%) (

Auto) 5.2, Eosinophils (%) (Auto) 1.6, Basophils (%) (Auto) 0.2, Neutrophils # (

Auto) 8.19, Lymphocytes # (Auto) 1.89, Monocytes # (Auto) 0.56, Eosinophils # (

Auto) 0.17, Basophils # (Auto) 0.02





7/23/18 05:32

















Test


  7/22/18


14:53 7/23/18


00:00 7/23/18


05:32


 


Magnesium Level


  2.4 mg/dl


(1.8-2.4) 


  


 


 


Direct Bilirubin


  0.2 mg/dl


(0-0.2) 


  


 


 


Urine Color  YELLOW  


 


Urine Appearance  CLEAR (CLEAR)  


 


Urine pH  5.5 (4.5-7.5)  


 


Urine Specific Gravity


  


  1.017


(1.000-1.030) 


 


 


Urine Protein  TRACE (NEG)  


 


Urine Glucose (UA)  NEG (NEG)  


 


Urine Ketones  1+ (NEG)  


 


Urine Occult Blood  1+ (NEG)  


 


Urine Nitrite  NEG (NEG)  


 


Urine Bilirubin  NEG (NEG)  


 


Urine Urobilinogen  NEG (NEG)  


 


Urine Leukocyte Esterase  TRACE (NEG)  


 


Urine WBC (Auto)  1-5 /hpf (0-5)  


 


Urine RBC (Auto)  0-4 /hpf (0-4)  


 


Urine Hyaline Casts (Auto)  0 /lpf (0-5)  


 


Urine Epithelial Cells (Auto)


  


  10-20 /lpf


(0-5) 


 


 


Urine Bacteria (Auto)  NEG (NEG)  


 


White Blood Count


  


  


  10.87 K/uL


(4.8-10.8)


 


Red Blood Count


  


  


  3.10 M/uL


(4.2-5.4)


 


Hemoglobin


  


  


  10.9 g/dL


(12.0-16.0)


 


Hematocrit   34.2 % (37-47) 


 


Mean Corpuscular Volume


  


  


  110.3 fL


()


 


Mean Corpuscular Hemoglobin


  


  


  35.2 pg


(25-34)


 


Mean Corpuscular Hemoglobin


Concent 


  


  31.9 g/dl


(32-36)


 


Platelet Count


  


  


  262 K/uL


(130-400)


 


Mean Platelet Volume


  


  


  8.9 fL


(7.4-10.4)


 


Neutrophils (%) (Auto)   75.2 % 


 


Lymphocytes (%) (Auto)   17.4 % 


 


Monocytes (%) (Auto)   5.2 % 


 


Eosinophils (%) (Auto)   1.6 % 


 


Basophils (%) (Auto)   0.2 % 


 


Neutrophils # (Auto)


  


  


  8.19 K/uL


(1.4-6.5)


 


Lymphocytes # (Auto)


  


  


  1.89 K/uL


(1.2-3.4)


 


Monocytes # (Auto)


  


  


  0.56 K/uL


(0.11-0.59)


 


Eosinophils # (Auto)


  


  


  0.17 K/uL


(0-0.5)


 


Basophils # (Auto)


  


  


  0.02 K/uL


(0-0.2)


 


RDW Standard Deviation


  


  


  56.1 fL


(36.4-46.3)


 


RDW Coefficient of Variation


  


  


  14.0 %


(11.5-14.5)


 


Immature Granulocyte % (Auto)   0.4 % 


 


Immature Granulocyte # (Auto)


  


  


  0.04 K/uL


(0.00-0.02)


 


Echinocytes   1+ 


 


Prothrombin Time


  


  


  11.0 SECONDS


(9.0-12.0)


 


Prothromb Time International


Ratio 


  


  1.0 (0.9-1.1) 


 


 


Anion Gap


  


  


  13.0 mmol/L


(3-11)


 


Est Creatinine Clear Calc


Drug Dose 


  


  39.9 ml/min 


 


 


Estimated GFR (


American) 


  


  47.8 


 


 


Estimated GFR (Non-


American 


  


  41.2 


 


 


BUN/Creatinine Ratio   32.0 (10-20) 


 


Calcium Level


  


  


  7.9 mg/dl


(8.5-10.1)


 


Total Bilirubin


  


  


  0.4 mg/dl


(0.2-1)


 


Aspartate Amino Transf


(AST/SGOT) 


  


  51 U/L (15-37) 


 


 


Alanine Aminotransferase


(ALT/SGPT) 


  


  34 U/L (12-78) 


 


 


Alkaline Phosphatase


  


  


  69 U/L


()


 


Total Protein


  


  


  6.0 gm/dl


(6.4-8.2)


 


Albumin


  


  


  2.8 gm/dl


(3.4-5.0)


 


Globulin


  


  


  3.2 gm/dl


(2.5-4.0)


 


Albumin/Globulin Ratio   0.9 (0.9-2) 














 Date/Time


Source Procedure


Growth Status


 


 


 7/22/18 17:01


Stool C.difficile Toxin B Gene (PCR) - Final


Positive for C. difficile toxin B gene Complete











Assessment and Plan


73F here for h/o loose stools and poor po intake. Admitted with elevated 

creatinine and heme + stool. History significant for 3 other bouts of c diff + 

diarrhea, once having sequelae of DANY requiring temporary dialysis. 





recurrent c. diff + stools, also 


- Sept 2015, then Oct 2015, then Feb 2016


- begun on fidaxomicin overnight


- contact precautions


- if fails, could consider fecal transplant in outpatient setting





heme + stools


- hgb is wnl on admission (12.7), diluted after fluids overnight. Follow cbc q12








DANY in setting of dehydration and recurrent C.Diff


- fluid hydration @ 100ml/hr


- follow BMP





Afib on ASA, HTN/HLD


- currently normal sinus


- due to subjective h/o blood in stools, aspirin held. Hgb is wnl. Consider 

restart ASA


- continue labetalol 200 BID


- continue home lipitor, fenofibrate





GERD


- prilosec held due to increasing c diff risk - can switch to H2 blocker PRN. 

No reflux sx's currently.





Depression/anxiety


- continue home mirtazapine








FEN/GI


- regular diet


- IVF 2 L given in ED, continue hydration NSS @ 100ml/hr


DVT ppx: mechanical SCD


Dispo: med/surg. PT/OT. 





Resident Physician Supervision Note:





I interviewed and examined the patient. Discussed with Dr. Mariscal and agree 

with findings and plan as documented in the note. Any exceptions or 

clarifications are listed here: None





Documented By:  Harris Hernandes


feeling better on dificid


would like to look into fecal transplant  - believes this is third recurrence 

of Cdiff





vitals noted nad breathing unlabored no pallor or icterus





recurrent Cdiff


-improving on dificid


may need to consider fecal transplant, but hopefully home tomorrow if ongoing 

improvement


Continued [facility] stay due to:  multiple IV medications needed





Resident Tracking


Resident Involvement:  Resident Care Provided


Care Provided:  Adult Hospital Medicine

## 2018-07-23 NOTE — CLINICAL DOCUMENTATION QUERY
**** CLINICAL DOCUMENTATION QUERY****



73 year old female who presents with recurrent C.Diff. Creatinine was elevated at 2.29 on 
presentation.



In your clinical opinion is this patient being managed for:

    

                        ( x ) DANY in setting of dehydration and recurrent C.Diff

                        (  ) Not Agree



                        (  ) Other explanation of clinical findings (No explanation is 
considered a No Response)

                        (  ) Unable to determine 

                        (  ) Need to Discuss (Phone CDS or qliq) (No discussion is 
considered a No Response)

                                             

The medical record reflects the following clinical findings, treatment, and risk factors.  
  



Clinical Indicators: BUN 63, Creatinine 2.29, GFR 20.2.



Treatment: IVF boluses the NSS primary,  daily PRP's, 



Risk Factors: Age, diarrhea, C.diff with bleeding.



Please clarify and document your clinical opinion in the progress notes and discharge 
summary. Terms such as "probable", "suspected", "likely", "questionable", "possible", or 
"still to be ruled out" are acceptable. 



*****IF IN AGREEMENT, YOU MUST DOCUMENT ABOVE DIAGNOSTIC STATEMENT IN DAILY PROGRESS NOTES 
AND DISCHARGE SUMMARY. This document is not part of the patient's record.*****



Thank You, Oliverio Mccurdy RN  [phone] & via qlicCONNECT

## 2018-07-24 VITALS — DIASTOLIC BLOOD PRESSURE: 83 MMHG | HEART RATE: 105 BPM | SYSTOLIC BLOOD PRESSURE: 154 MMHG

## 2018-07-24 VITALS — OXYGEN SATURATION: 94 %

## 2018-07-24 VITALS
DIASTOLIC BLOOD PRESSURE: 94 MMHG | HEART RATE: 95 BPM | SYSTOLIC BLOOD PRESSURE: 156 MMHG | TEMPERATURE: 98.06 F | OXYGEN SATURATION: 95 %

## 2018-07-24 VITALS — OXYGEN SATURATION: 96 %

## 2018-07-24 VITALS
TEMPERATURE: 98.06 F | DIASTOLIC BLOOD PRESSURE: 83 MMHG | SYSTOLIC BLOOD PRESSURE: 154 MMHG | OXYGEN SATURATION: 96 % | HEART RATE: 105 BPM

## 2018-07-24 RX ADMIN — ATORVASTATIN CALCIUM SCH MG: 40 TABLET, FILM COATED ORAL at 08:12

## 2018-07-24 RX ADMIN — FENOFIBRATE SCH MG: 48 TABLET, FILM COATED ORAL at 08:13

## 2018-07-24 RX ADMIN — LABETALOL HCL SCH MG: 200 TABLET, FILM COATED ORAL at 08:12

## 2018-07-24 RX ADMIN — GABAPENTIN SCH MG: 300 CAPSULE ORAL at 13:52

## 2018-07-24 RX ADMIN — LACTOBACILLUS TAB SCH TAB: TAB at 08:12

## 2018-07-24 RX ADMIN — SODIUM CHLORIDE SCH MLS/HR: 900 INJECTION, SOLUTION INTRAVENOUS at 06:07

## 2018-07-24 RX ADMIN — GABAPENTIN SCH MG: 300 CAPSULE ORAL at 08:12

## 2018-07-24 RX ADMIN — FIDAXOMICIN SCH MG: 200 TABLET, FILM COATED ORAL at 08:11

## 2018-07-24 RX ADMIN — LEVOTHYROXINE SODIUM SCH MCG: 88 TABLET ORAL at 06:07

## 2018-07-24 NOTE — DISCHARGE INSTRUCTIONS
Discharge Instructions


Date of Service


Jul 24, 2018.





Admission


Reason for Admission:  Recurrent Clostridium Difficile Diarrhea





Discharge


Discharge Diagnosis / Problem:  Recurrent clostridium difficile diarrhea





Discharge Goals


Goal(s):  Decrease discomfort, Improve function, Increase independence, Improve 

disease control, Diagnostic testing, Therapeutic intervention





Activity Recommendations


Activity Limitations:  per Instructions/Follow-up section





.





Instructions / Follow-Up


Instructions / Follow-Up


   You were admitted due to concern for weakness and loose stools and were 

found to have stools + for c. diff. 


   We started you on fidaxomicin twice a day. This seems to be helping your 

infection.


   Seeing as this is your fourth infection according to our records, we 

recommend looking in to fecal transplant in the future. This is being looked in 

to by our case management folks, and they will be in contact with you and your 

primary doctor regarding this.


   In the meantime, a certain class of drugs called proton pump inhibitors has 

been associated with higher risk of recurrent c. diff infection. We recommend 

switching from prilosec to Zantac (which is in the class called H2 blocker) - 

both show good efficacy with reflux symptoms. You can take this as needed, once 

a day. 





We have made no other changes to your chronic medications. You do not need to 

take vancomycin caps at this time while you are on fidaxomicin.  Please follow 

up with  your primary doctor within 1 week of discharge.





Be Well


A Loida





Current Hospital Diet


Patient's current hospital diet: Regular Diet





Discharge Diet


Recommended Diet:  Regular Diet





Pending Studies


Studies pending at discharge:  no





Medical Emergencies








.


Who to Call and When:





Medical Emergencies:  If at any time you feel your situation is an emergency, 

please call 911 immediately.





.





Non-Emergent Contact


Non-Emergency issues call your:  Primary Care Provider


Call Non-Emergent contact if:  you have a fever, temperature is above 101.5, 

your pain is unusual for you, your pain is concerning you, you have any 

medication questions





.


.








"Provider Documentation" section prepared by Sharonda Mariscal.








.

## 2018-07-24 NOTE — DISCHARGE SUMMARY
Discharge Summary


Date of Service


Jul 24, 2018.





Discharge Summary


Admission Date:


Jul 22, 2018 at 21:02


Discharge Date:  Jul 24, 2018


Discharge Disposition:  Home


Principal Diagnosis:  recurrent c. diff infection


Immunizations:  


   Have You Had Influenza Vaccine:  Yes


   History of Tetanus Vaccine?:  Yes


   History of Pneumococcal:  Yes


   History of Hepatitis B Vaccine:  No





Medication Reconciliation


New Medications:  


Ranitidine Hcl (Zantac) 150 Mg Tab


150 MG PO DAILY for 30 Days, #30 TAB





Fidaxomicin (Dificid) 200 Mg Tab


200 MG PO BID for 10 Days, #20 TAB





 


Continued Medications:  


Ascorbic Acid (Vitamin C) 500 Mg Tab


1 TAB PO QAM





Aspirin (Aspirin Ec) 81 Mg Tab


81 MG PO QAM





Atorvastatin (Lipitor) 40 Mg Tab


40 MG PO HS, TAB





Calcium/Vitamin D (Os-Jhonny 500 Plus D)  Tab


1 TAB PO BID, TAB





Cholecalciferol (Vitamin D) 1,000 Unit Tab


1 TAB PO QPM





Cyanocobalamin (Vitamin B-12) 1,000 Mcg Tab


1000 MCG PO QAM, TAB





Fenofibrate (Fenofibrate) 48 Mg Tab


48 MG PO QAM





Gabapentin (Neurontin) 300 Mg Cap


300 MG PO TID, CAP 3 Refills





Labetalol Hcl (Labetalol Hcl) 200 Mg Tab


200 MG PO BID, TAB





Levothyroxine Sodium (Synthroid) 88 Mcg Tab


88 MCG PO QAM, TAB





Mirtazapine (Mirtazapine) 7.5 Mg Tab


7.5 MG PO HS





Probiotic Product (Probiotic) 1 Cap Cap


1 CAP PO QAM





 


Discontinued Medications:  


Omeprazole (Prilosec) 20 Mg Cap


20 MG PO QAM





Vancomycin Hcl (Vancomycin) 250 Mg Cap


1 CAP PO QID, #40 CAP











Discharge Exam


Pt comfortable in bed, tolerating PO on day of discharge. Reports no loose or 

bloody BMs.





ROS


See HPI for pertinent positives and negatives. Otherwise denies new headache, 

vision change, chest pain, dyspnea, abdominal pain, loose or bloody stools, 

dysuria, or numbness tingling in extremities.





PHYSICAL EXAM


GENERAL: Awake, alert, appears stated age, in no distress


HENT: Normocephalic, atraumatic.  Moist mucous membranes


EYES: Normal conjunctiva. Sclera non-icteric. EOMI. 


NECK: Supple. Full range of motion. no JVD


RESPIRATORY: Clear to auscultation.


CARDIAC: Regular rate, normal rhythm. Extremities warm and well perfused. 

Pulses equal.


ABDOMEN: Soft, non-distended. No tenderness to palpation. No rebound or 

guarding. No masses. Normoactive BS.


LOWER EXTREMITIES: Calves are equal size bilaterally and non-tender. No edema. 

No discoloration. 


NEURO: No motor deficits noted. 


SKIN: No rash or jaundice noted.





Hospital Course


   Ms. Portillo is a 73F here for malaise, h/o loose stools and poor po intake. 

When she was admitted, she was found to have elevated creatinine, c. diff + and 

heme + stool. On review of records, her medical history significant for 3 other 

bouts of c diff + diarrhea ( Sept 2015, then Oct 2015, then Feb 2016), once 

having sequelae of DANY requiring temporary dialysis. Tolerated fidaxomicin here

, begun and sent home with script. We also have consulted with case management 

for arranging for outpatient fecal transplant. Pt was amenable to this.


   For her DANY in setting of dehydration and recurrent C.Diff  hydrated her, 

and her creatinine trended back down to baseline. 


   Pt also has Atrial fibrillation (on ASA), as well as HTN/HLD. Was sinus 

here. Given heme + stools, held ASA, but reasonable to restart on discharge due 

to no evidence of significant anemia. Recommend continue home labetalol 200 BID

, lipitor, fenofibrate


   Pt is on prilosec daily for reflux -- we recommend changing this to H2 

blocker due to some evidence for PPIs associated with increased risk of 

recurrent c diff. 


   For her depression/anxiety- continue home mirtazapine





ELSA Mariscal








Resident Physician Supervision Note:





I interviewed and examined the patient. Discussed with Dr. Mariscal and agree 

with findings and plan as documented in the note. Any exceptions or 

clarifications are listed here: None





Documented By:  Harris Hernandes





feeling better no BM, wants to go home


vitals noted nad breathing unlabored no pallor or icterus





recurrent Cdiff colitis (technically with mild SIRS making for sepsis on 

admission)


-improved nicely w dificid - finish course of therapy - but since this has been 

recurrent and refractory for her - will as navigator to facilitate GI eval for 

appropriateness of fecal transplant - that way should she recur after treatment 

she'll have referral process already underway


Total Time Spent:  Greater than 30 minutes


This includes examination of the patient, discharge planning, medication 

reconciliation, and communication with other providers.





Discharge Instructions


Please refer to the electronic Patient Visit Report (Discharge Instructions) 

for additional information.





Additional Copies To


BRANDEE Andujar MD





Resident Tracking


Resident Involvement:  Resident Care Provided


Care Provided:  Adult [facility]